# Patient Record
Sex: FEMALE | Race: WHITE | Employment: FULL TIME | URBAN - METROPOLITAN AREA
[De-identification: names, ages, dates, MRNs, and addresses within clinical notes are randomized per-mention and may not be internally consistent; named-entity substitution may affect disease eponyms.]

---

## 2018-10-29 ENCOUNTER — OFFICE VISIT (OUTPATIENT)
Dept: FAMILY MEDICINE CLINIC | Age: 53
End: 2018-10-29
Payer: COMMERCIAL

## 2018-10-29 VITALS
HEART RATE: 80 BPM | DIASTOLIC BLOOD PRESSURE: 80 MMHG | WEIGHT: 208.8 LBS | SYSTOLIC BLOOD PRESSURE: 112 MMHG | HEIGHT: 67 IN | OXYGEN SATURATION: 96 % | BODY MASS INDEX: 32.77 KG/M2

## 2018-10-29 DIAGNOSIS — Z78.0 POST-MENOPAUSAL: ICD-10-CM

## 2018-10-29 DIAGNOSIS — F41.9 ANXIETY: ICD-10-CM

## 2018-10-29 DIAGNOSIS — Z86.19 HX OF HISTOPLASMOSIS: ICD-10-CM

## 2018-10-29 DIAGNOSIS — Z00.00 HEALTHCARE MAINTENANCE: ICD-10-CM

## 2018-10-29 DIAGNOSIS — Z76.89 ENCOUNTER TO ESTABLISH CARE: ICD-10-CM

## 2018-10-29 DIAGNOSIS — R06.02 SOB (SHORTNESS OF BREATH): Primary | ICD-10-CM

## 2018-10-29 PROCEDURE — G8482 FLU IMMUNIZE ORDER/ADMIN: HCPCS | Performed by: FAMILY MEDICINE

## 2018-10-29 PROCEDURE — 99204 OFFICE O/P NEW MOD 45 MIN: CPT | Performed by: FAMILY MEDICINE

## 2018-10-29 PROCEDURE — 1036F TOBACCO NON-USER: CPT | Performed by: FAMILY MEDICINE

## 2018-10-29 PROCEDURE — 3017F COLORECTAL CA SCREEN DOC REV: CPT | Performed by: FAMILY MEDICINE

## 2018-10-29 PROCEDURE — G8427 DOCREV CUR MEDS BY ELIG CLIN: HCPCS | Performed by: FAMILY MEDICINE

## 2018-10-29 PROCEDURE — G8417 CALC BMI ABV UP PARAM F/U: HCPCS | Performed by: FAMILY MEDICINE

## 2018-10-29 RX ORDER — CITALOPRAM 10 MG/1
10 TABLET ORAL DAILY
Qty: 33 TABLET | Refills: 0 | Status: SHIPPED | OUTPATIENT
Start: 2018-10-29 | End: 2019-02-18 | Stop reason: SDUPTHER

## 2018-10-29 RX ORDER — LORAZEPAM 1 MG/1
1 TABLET ORAL EVERY 6 HOURS PRN
COMMUNITY
End: 2018-12-03 | Stop reason: ALTCHOICE

## 2018-10-29 RX ORDER — BUSPIRONE HYDROCHLORIDE 10 MG/1
5 TABLET ORAL 2 TIMES DAILY
COMMUNITY
End: 2019-01-25 | Stop reason: ALTCHOICE

## 2018-10-29 RX ORDER — MELOXICAM 15 MG/1
15 TABLET ORAL DAILY
Qty: 90 TABLET | Refills: 1 | Status: SHIPPED | COMMUNITY
Start: 2018-10-29 | End: 2018-12-03 | Stop reason: SDUPTHER

## 2018-10-29 RX ORDER — MELOXICAM 7.5 MG/1
7.5 TABLET ORAL DAILY
COMMUNITY
End: 2018-10-29 | Stop reason: DRUGHIGH

## 2018-10-29 RX ORDER — LEVOTHYROXINE SODIUM 137 UG/1
137 TABLET ORAL DAILY
COMMUNITY
End: 2019-01-25 | Stop reason: SDUPTHER

## 2018-10-29 ASSESSMENT — ENCOUNTER SYMPTOMS
SHORTNESS OF BREATH: 1
TROUBLE SWALLOWING: 0
COLOR CHANGE: 0
CHEST TIGHTNESS: 0
CONSTIPATION: 0
DIARRHEA: 0
WHEEZING: 0
COUGH: 0
NAUSEA: 0
BLOOD IN STOOL: 0
SORE THROAT: 0
VOMITING: 0
ABDOMINAL PAIN: 0
BACK PAIN: 0

## 2018-10-29 ASSESSMENT — PATIENT HEALTH QUESTIONNAIRE - PHQ9
SUM OF ALL RESPONSES TO PHQ9 QUESTIONS 1 & 2: 2
1. LITTLE INTEREST OR PLEASURE IN DOING THINGS: 1
SUM OF ALL RESPONSES TO PHQ QUESTIONS 1-9: 2
SUM OF ALL RESPONSES TO PHQ QUESTIONS 1-9: 2
2. FEELING DOWN, DEPRESSED OR HOPELESS: 1

## 2018-10-29 NOTE — PATIENT INSTRUCTIONS
risk for heart attack and stroke. · Blood pressure. Have your blood pressure checked during a routine doctor visit. Your doctor will tell you how often to check your blood pressure based on your age, your blood pressure results, and other factors. · Mammogram. Ask your doctor how often you should have a mammogram, which is an X-ray of your breasts. A mammogram can spot breast cancer before it can be felt and when it is easiest to treat. · Pap test and pelvic exam. Ask your doctor how often you should have a Pap test. You may not need to have a Pap test as often as you used to. · Vision. Have your eyes checked every year or two or as often as your doctor suggests. Some experts recommend that you have yearly exams for glaucoma and other age-related eye problems starting at age 48. · Hearing. Tell your doctor if you notice any change in your hearing. You can have tests to find out how well you hear. · Diabetes. Ask your doctor whether you should have tests for diabetes. · Colon cancer. You should begin tests for colon cancer at age 48. You may have one of several tests. Your doctor will tell you how often to have tests based on your age and risk. Risks include whether you already had a precancerous polyp removed from your colon or whether your parents, sisters and brothers, or children have had colon cancer. · Thyroid disease. Talk to your doctor about whether to have your thyroid checked as part of a regular physical exam. Women have an increased chance of a thyroid problem. · Osteoporosis. You should begin tests for bone density at age 72. If you are younger than 72, ask your doctor whether you have factors that may increase your risk for this disease. You may want to have this test before age 72. · Heart attack and stroke risk. At least every 4 to 6 years, you should have your risk for heart attack and stroke assessed.  Your doctor uses factors such as your age, blood pressure, cholesterol, and whether you for longer than recommended. Measure liquid medicine with the dosing syringe provided, or with a special dose-measuring spoon or medicine cup. If you do not have a dose-measuring device, ask your pharmacist for one. It may take up to 4 weeks before your symptoms improve. Keep using the medication as directed and tell your doctor if your symptoms do not improve. Do not stop using citalopram suddenly, or you could have unpleasant withdrawal symptoms. Ask your doctor how to safely stop using this medicine. Store at room temperature away from moisture and heat. What happens if I miss a dose? Take the missed dose as soon as you remember. Skip the missed dose if it is almost time for your next scheduled dose. Do not take extra medicine to make up the missed dose. What happens if I overdose? Seek emergency medical attention or call the Poison Help line at 1-254.199.2548. What should I avoid while taking citalopram?  Ask your doctor before taking a nonsteroidal anti-inflammatory drug (NSAID) for pain, arthritis, fever, or swelling. This includes aspirin, ibuprofen (Advil, Motrin), naproxen (Aleve), celecoxib (Celebrex), diclofenac, indomethacin, meloxicam, and others. Using an NSAID with citalopram may cause you to bruise or bleed easily. Drinking alcohol can increase certain side effects of citalopram.  Citalopram may impair your thinking or reactions. Be careful if you drive or do anything that requires you to be alert. What are the possible side effects of citalopram?  Get emergency medical help if you have signs of an allergic reaction: skin rash or hives; difficulty breathing; swelling of your face, lips, tongue, or throat.   Report any new or worsening symptoms to your doctor, such as: mood or behavior changes, anxiety, panic attacks, trouble sleeping, or if you feel impulsive, irritable, agitated, hostile, aggressive, restless, hyperactive (mentally or physically), more depressed, or have thoughts about suicide or hurting yourself. Call your doctor at once if you have:  · a light-headed feeling, like you might pass out;  · blurred vision, tunnel vision, eye pain or swelling, or seeing halos around lights;  · headache with chest pain and severe dizziness, fainting, fast or pounding heartbeats;  · severe nervous system reaction --very stiff (rigid) muscles, high fever, sweating, confusion, fast or uneven heartbeats, tremors, feeling like you might pass out;  · high levels of serotonin in the body --agitation, hallucinations, fever, fast heart rate, overactive reflexes, nausea, vomiting, diarrhea, loss of coordination, fainting; or  · low levels of sodium in the body --headache, confusion, slurred speech, severe weakness, vomiting, feeling unsteady. Common side effects may include:  · problems with memory or concentration;  · headache, drowsiness;  · dry mouth, increased sweating;  · numbness or tingling;  · increased appetite, nausea, diarrhea, gas;  · fast heartbeats, feeling shaky;  · sleep problems (insomnia), feeling tired;  · cold symptoms such as stuffy nose, sneezing, sore throat;  · changes in weight; or  · difficulty having an orgasm. This is not a complete list of side effects and others may occur. Call your doctor for medical advice about side effects. You may report side effects to FDA at 2-264-FDA-6439. What other drugs will affect citalopram?  Taking citalopram with other drugs that make you sleepy or slow your breathing can cause dangerous side effects or death. Ask your doctor before taking a sleeping pill, narcotic pain medicine, prescription cough medicine, a muscle relaxer, or medicine for anxiety, depression, or seizures. Many drugs can interact with citalopram. Not all possible interactions are listed here.  Tell your doctor about all your current medicines and any you start or stop using, especially:  · cimetidine;  · lithium;  · [place]'s wort;  · tryptophan (sometimes called

## 2018-10-30 ENCOUNTER — HOSPITAL ENCOUNTER (OUTPATIENT)
Dept: GENERAL RADIOLOGY | Age: 53
Discharge: HOME | End: 2018-10-30
Payer: COMMERCIAL

## 2018-10-30 ENCOUNTER — HOSPITAL ENCOUNTER (OUTPATIENT)
Age: 53
Discharge: HOME | End: 2018-10-30
Payer: COMMERCIAL

## 2018-10-30 DIAGNOSIS — R06.02 SHORTNESS OF BREATH: ICD-10-CM

## 2018-10-30 DIAGNOSIS — Z86.19 HX OF HISTOPLASMOSIS: ICD-10-CM

## 2018-10-30 DIAGNOSIS — Z86.19 HISTORY OF HISTOPLASMOSIS: ICD-10-CM

## 2018-10-30 DIAGNOSIS — R06.02 SOB (SHORTNESS OF BREATH): ICD-10-CM

## 2018-10-30 PROCEDURE — 71046 X-RAY EXAM CHEST 2 VIEWS: CPT

## 2018-11-28 PROBLEM — Z00.00 HEALTHCARE MAINTENANCE: Status: RESOLVED | Noted: 2018-10-29 | Resolved: 2018-11-28

## 2018-12-03 ENCOUNTER — OFFICE VISIT (OUTPATIENT)
Dept: FAMILY MEDICINE CLINIC | Age: 53
End: 2018-12-03
Payer: COMMERCIAL

## 2018-12-03 VITALS
BODY MASS INDEX: 32.83 KG/M2 | HEART RATE: 70 BPM | WEIGHT: 209.6 LBS | DIASTOLIC BLOOD PRESSURE: 82 MMHG | SYSTOLIC BLOOD PRESSURE: 116 MMHG | OXYGEN SATURATION: 98 %

## 2018-12-03 DIAGNOSIS — R06.83 SNORING: ICD-10-CM

## 2018-12-03 DIAGNOSIS — F41.9 ANXIETY: Primary | ICD-10-CM

## 2018-12-03 DIAGNOSIS — R53.82 CHRONIC FATIGUE: ICD-10-CM

## 2018-12-03 DIAGNOSIS — Z00.00 HEALTHCARE MAINTENANCE: ICD-10-CM

## 2018-12-03 DIAGNOSIS — H93.8X1 CONGESTION OF RIGHT EAR: ICD-10-CM

## 2018-12-03 PROCEDURE — 1036F TOBACCO NON-USER: CPT | Performed by: FAMILY MEDICINE

## 2018-12-03 PROCEDURE — G8427 DOCREV CUR MEDS BY ELIG CLIN: HCPCS | Performed by: FAMILY MEDICINE

## 2018-12-03 PROCEDURE — 99214 OFFICE O/P EST MOD 30 MIN: CPT | Performed by: FAMILY MEDICINE

## 2018-12-03 PROCEDURE — G8417 CALC BMI ABV UP PARAM F/U: HCPCS | Performed by: FAMILY MEDICINE

## 2018-12-03 PROCEDURE — 3017F COLORECTAL CA SCREEN DOC REV: CPT | Performed by: FAMILY MEDICINE

## 2018-12-03 PROCEDURE — G8482 FLU IMMUNIZE ORDER/ADMIN: HCPCS | Performed by: FAMILY MEDICINE

## 2018-12-03 RX ORDER — CETIRIZINE HYDROCHLORIDE 10 MG/1
10 TABLET ORAL DAILY
Qty: 30 TABLET | Refills: 5 | Status: SHIPPED | OUTPATIENT
Start: 2018-12-03 | End: 2019-05-23 | Stop reason: SDUPTHER

## 2018-12-03 RX ORDER — MELOXICAM 15 MG/1
15 TABLET ORAL DAILY
Qty: 90 TABLET | Refills: 0 | Status: SHIPPED | OUTPATIENT
Start: 2018-12-03 | End: 2019-01-25 | Stop reason: ALTCHOICE

## 2018-12-03 RX ORDER — FLUTICASONE PROPIONATE 50 MCG
2 SPRAY, SUSPENSION (ML) NASAL DAILY
Qty: 1 BOTTLE | Refills: 0 | Status: SHIPPED | OUTPATIENT
Start: 2018-12-03

## 2018-12-03 ASSESSMENT — ENCOUNTER SYMPTOMS
SHORTNESS OF BREATH: 0
CONSTIPATION: 0
COLOR CHANGE: 0
VOMITING: 0
BACK PAIN: 0
CHEST TIGHTNESS: 0
DIARRHEA: 0
ABDOMINAL PAIN: 0
ABDOMINAL DISTENTION: 0
NAUSEA: 0

## 2018-12-03 NOTE — PROGRESS NOTES
12/3/2018    This is a 48 y.o. female who presents for  Chief Complaint   Patient presents with    Medication Check    Sinus Problem       HPI:     Anxiety:  Celexa and Buspar prescribed prior  Increased hours at work, working on sat/sun  Until 12/21  Hasn't started Celexa  Worried about false pos drug tests   Buspar helping  Gone from the house 13 hours a day so often forgets medication    April/march possibly had pap smear   [facility]   Dr. [place]     R fullness of ear and throat  Things feel like they get stuck with swallowing  Wakes self up at night with SOB/snoring   ?deviated septum   Inc fatigue during the day       Past Medical History:   Diagnosis Date    Anxiety     can't sleep       Past Surgical History:   Procedure Laterality Date    ANKLE SURGERY  2016    CHOLECYSTECTOMY, LAPAROSCOPIC  2008    MANDIBLE SURGERY      underbite    THYROIDECTOMY, PARTIAL Right 2003    hashimoto's on path, nodular to intiate   [address] [place]  2015       Social History     Social History    Marital status:      Spouse name: N/A    Number of children: N/A    Years of education: N/A     Occupational History    Not on file.      Social History Main Topics    Smoking status: Never Smoker    Smokeless tobacco: Never Used    Alcohol use No    Drug use: No    Sexual activity: Yes     Other Topics Concern    Not on file     Social History Narrative    No narrative on file       Family History   Problem Relation Age of Onset    Diabetes Mother     Prostate Cancer Father         76s    Diabetes Paternal Uncle     Cancer Paternal Uncle     Prostate Cancer Paternal Uncle     Cancer Paternal Grandmother         stomach       Current Outpatient Prescriptions   Medication Sig Dispense Refill    meloxicam (MOBIC) 15 MG tablet Take 1 tablet by mouth daily 90 tablet 0    cetirizine (ZYRTEC) 10 MG tablet Take 1 tablet by mouth daily 30 tablet 5

## 2019-01-25 ENCOUNTER — OFFICE VISIT (OUTPATIENT)
Dept: FAMILY MEDICINE CLINIC | Age: 54
End: 2019-01-25
Payer: COMMERCIAL

## 2019-01-25 VITALS
HEART RATE: 65 BPM | SYSTOLIC BLOOD PRESSURE: 110 MMHG | WEIGHT: 202 LBS | OXYGEN SATURATION: 97 % | DIASTOLIC BLOOD PRESSURE: 80 MMHG | BODY MASS INDEX: 31.64 KG/M2 | TEMPERATURE: 98.5 F

## 2019-01-25 DIAGNOSIS — E03.9 ACQUIRED HYPOTHYROIDISM: ICD-10-CM

## 2019-01-25 DIAGNOSIS — Z86.19 HISTORY OF HISTOPLASMOSIS: ICD-10-CM

## 2019-01-25 DIAGNOSIS — J01.90 ACUTE BACTERIAL SINUSITIS: Primary | ICD-10-CM

## 2019-01-25 DIAGNOSIS — B96.89 ACUTE BACTERIAL SINUSITIS: Primary | ICD-10-CM

## 2019-01-25 DIAGNOSIS — Z00.00 HEALTHCARE MAINTENANCE: ICD-10-CM

## 2019-01-25 PROCEDURE — G8417 CALC BMI ABV UP PARAM F/U: HCPCS | Performed by: FAMILY MEDICINE

## 2019-01-25 PROCEDURE — 99213 OFFICE O/P EST LOW 20 MIN: CPT | Performed by: FAMILY MEDICINE

## 2019-01-25 PROCEDURE — 3017F COLORECTAL CA SCREEN DOC REV: CPT | Performed by: FAMILY MEDICINE

## 2019-01-25 PROCEDURE — G8482 FLU IMMUNIZE ORDER/ADMIN: HCPCS | Performed by: FAMILY MEDICINE

## 2019-01-25 PROCEDURE — G8427 DOCREV CUR MEDS BY ELIG CLIN: HCPCS | Performed by: FAMILY MEDICINE

## 2019-01-25 PROCEDURE — 1036F TOBACCO NON-USER: CPT | Performed by: FAMILY MEDICINE

## 2019-01-25 RX ORDER — AMOXICILLIN AND CLAVULANATE POTASSIUM 875; 125 MG/1; MG/1
1 TABLET, FILM COATED ORAL 2 TIMES DAILY
Qty: 20 TABLET | Refills: 0 | Status: SHIPPED | OUTPATIENT
Start: 2019-01-25 | End: 2019-02-04

## 2019-01-25 RX ORDER — FLUTICASONE PROPIONATE 50 MCG
2 SPRAY, SUSPENSION (ML) NASAL DAILY
Qty: 1 BOTTLE | Refills: 0 | Status: SHIPPED | OUTPATIENT
Start: 2019-01-25 | End: 2019-02-18 | Stop reason: SDUPTHER

## 2019-01-25 RX ORDER — LEVOTHYROXINE SODIUM 137 UG/1
137 TABLET ORAL DAILY
Qty: 30 TABLET | Refills: 5 | Status: SHIPPED | OUTPATIENT
Start: 2019-01-25 | End: 2019-05-23 | Stop reason: SDUPTHER

## 2019-01-25 RX ORDER — CETIRIZINE HYDROCHLORIDE 10 MG/1
10 TABLET ORAL DAILY
Qty: 90 TABLET | Refills: 1 | Status: SHIPPED | OUTPATIENT
Start: 2019-01-25 | End: 2019-02-18 | Stop reason: ALTCHOICE

## 2019-01-25 ASSESSMENT — ENCOUNTER SYMPTOMS
TROUBLE SWALLOWING: 0
SINUS PAIN: 1
PHOTOPHOBIA: 0
EYE PAIN: 0
EYE ITCHING: 0
COUGH: 0
VOMITING: 0
CHEST TIGHTNESS: 0
CHOKING: 0
WHEEZING: 0
APNEA: 0
VOICE CHANGE: 0
SORE THROAT: 1
EYE REDNESS: 0
EYE DISCHARGE: 0
ABDOMINAL PAIN: 0
RHINORRHEA: 0
SHORTNESS OF BREATH: 0
SINUS PRESSURE: 1
STRIDOR: 0
NAUSEA: 0

## 2019-02-18 ENCOUNTER — OFFICE VISIT (OUTPATIENT)
Dept: FAMILY MEDICINE CLINIC | Age: 54
End: 2019-02-18
Payer: COMMERCIAL

## 2019-02-18 VITALS
OXYGEN SATURATION: 98 % | DIASTOLIC BLOOD PRESSURE: 80 MMHG | HEART RATE: 75 BPM | BODY MASS INDEX: 32.73 KG/M2 | WEIGHT: 209 LBS | SYSTOLIC BLOOD PRESSURE: 130 MMHG

## 2019-02-18 DIAGNOSIS — Z12.4 CERVICAL CANCER SCREENING: Primary | ICD-10-CM

## 2019-02-18 DIAGNOSIS — N89.8 VAGINAL DISCHARGE: ICD-10-CM

## 2019-02-18 DIAGNOSIS — F41.9 ANXIETY: ICD-10-CM

## 2019-02-18 RX ORDER — CITALOPRAM 10 MG/1
10 TABLET ORAL DAILY
Qty: 90 TABLET | Refills: 1 | Status: SHIPPED | OUTPATIENT
Start: 2019-02-18 | End: 2019-08-15

## 2019-02-18 RX ORDER — AZITHROMYCIN 250 MG/1
TABLET, FILM COATED ORAL
COMMUNITY
Start: 2019-02-10 | End: 2019-08-15 | Stop reason: ALTCHOICE

## 2019-02-18 ASSESSMENT — ENCOUNTER SYMPTOMS
ABDOMINAL DISTENTION: 0
VOMITING: 0
ABDOMINAL PAIN: 0
BACK PAIN: 0
NAUSEA: 0
COLOR CHANGE: 0
CONSTIPATION: 0
DIARRHEA: 0
SHORTNESS OF BREATH: 0

## 2019-02-18 ASSESSMENT — PATIENT HEALTH QUESTIONNAIRE - PHQ9
2. FEELING DOWN, DEPRESSED OR HOPELESS: 1
SUM OF ALL RESPONSES TO PHQ QUESTIONS 1-9: 2
1. LITTLE INTEREST OR PLEASURE IN DOING THINGS: 1
SUM OF ALL RESPONSES TO PHQ QUESTIONS 1-9: 2
SUM OF ALL RESPONSES TO PHQ9 QUESTIONS 1 & 2: 2

## 2019-02-19 LAB
C. TRACHOMATIS DNA,THIN PREP: NEGATIVE
CANDIDA SPECIES, DNA PROBE: NORMAL
GARDNERELLA VAGINALIS, DNA PROBE: NORMAL
N. GONORRHOEAE DNA, THIN PREP: NEGATIVE
TRICHOMONAS VAGINALIS DNA: NORMAL

## 2019-02-20 DIAGNOSIS — Z00.00 HEALTHCARE MAINTENANCE: ICD-10-CM

## 2019-02-20 LAB
A/G RATIO: 1.6 (ref 1.1–2.2)
ALBUMIN SERPL-MCNC: 4.4 G/DL (ref 3.4–5)
ALP BLD-CCNC: 81 U/L (ref 40–129)
ALT SERPL-CCNC: 14 U/L (ref 10–40)
ANION GAP SERPL CALCULATED.3IONS-SCNC: 14 MMOL/L (ref 3–16)
AST SERPL-CCNC: 17 U/L (ref 15–37)
BILIRUB SERPL-MCNC: 0.4 MG/DL (ref 0–1)
BUN BLDV-MCNC: 9 MG/DL (ref 7–20)
CALCIUM SERPL-MCNC: 9.4 MG/DL (ref 8.3–10.6)
CHLORIDE BLD-SCNC: 100 MMOL/L (ref 99–110)
CHOLESTEROL, TOTAL: 188 MG/DL (ref 0–199)
CO2: 23 MMOL/L (ref 21–32)
CREAT SERPL-MCNC: 0.6 MG/DL (ref 0.6–1.1)
GFR AFRICAN AMERICAN: >60
GFR NON-AFRICAN AMERICAN: >60
GLOBULIN: 2.7 G/DL
GLUCOSE BLD-MCNC: 88 MG/DL (ref 70–99)
HCT VFR BLD CALC: 39.7 % (ref 36–48)
HDLC SERPL-MCNC: 67 MG/DL (ref 40–60)
HEMOGLOBIN: 13 G/DL (ref 12–16)
HEPATITIS C ANTIBODY INTERPRETATION: NORMAL
HPV COMMENT: NORMAL
HPV TYPE 16: NOT DETECTED
HPV TYPE 18: NOT DETECTED
HPVOH (OTHER TYPES): NOT DETECTED
LDL CHOLESTEROL CALCULATED: 102 MG/DL
MCH RBC QN AUTO: 30.1 PG (ref 26–34)
MCHC RBC AUTO-ENTMCNC: 32.7 G/DL (ref 31–36)
MCV RBC AUTO: 92 FL (ref 80–100)
PDW BLD-RTO: 14 % (ref 12.4–15.4)
PLATELET # BLD: 292 K/UL (ref 135–450)
PMV BLD AUTO: 10.4 FL (ref 5–10.5)
POTASSIUM SERPL-SCNC: 4.5 MMOL/L (ref 3.5–5.1)
RBC # BLD: 4.32 M/UL (ref 4–5.2)
SODIUM BLD-SCNC: 137 MMOL/L (ref 136–145)
TOTAL PROTEIN: 7.1 G/DL (ref 6.4–8.2)
TRIGL SERPL-MCNC: 94 MG/DL (ref 0–150)
TSH REFLEX: 3.41 UIU/ML (ref 0.27–4.2)
VITAMIN D 25-HYDROXY: 21.2 NG/ML
VLDLC SERPL CALC-MCNC: 19 MG/DL
WBC # BLD: 6.6 K/UL (ref 4–11)

## 2019-02-21 LAB
ESTIMATED AVERAGE GLUCOSE: 116.9 MG/DL
HBA1C MFR BLD: 5.7 %
HIV AG/AB: NORMAL
HIV ANTIGEN: NORMAL
HIV-1 ANTIBODY: NORMAL
HIV-2 AB: NORMAL

## 2019-03-07 ENCOUNTER — TELEPHONE (OUTPATIENT)
Dept: FAMILY MEDICINE CLINIC | Age: 54
End: 2019-03-07

## 2019-05-23 ENCOUNTER — TELEPHONE (OUTPATIENT)
Dept: FAMILY MEDICINE CLINIC | Age: 54
End: 2019-05-23

## 2019-05-23 ENCOUNTER — TELEPHONE (OUTPATIENT)
Dept: ORTHOPEDIC SURGERY | Age: 54
End: 2019-05-23

## 2019-05-23 ENCOUNTER — OFFICE VISIT (OUTPATIENT)
Dept: ORTHOPEDIC SURGERY | Age: 54
End: 2019-05-23
Payer: COMMERCIAL

## 2019-05-23 VITALS — WEIGHT: 195 LBS | BODY MASS INDEX: 30.61 KG/M2 | HEIGHT: 67 IN

## 2019-05-23 DIAGNOSIS — M25.561 RIGHT KNEE PAIN, UNSPECIFIED CHRONICITY: Primary | ICD-10-CM

## 2019-05-23 DIAGNOSIS — S83.211A BUCKET-HANDLE TEAR OF MEDIAL MENISCUS OF RIGHT KNEE AS CURRENT INJURY, INITIAL ENCOUNTER: Primary | ICD-10-CM

## 2019-05-23 DIAGNOSIS — R06.02 SHORTNESS OF BREATH: Primary | ICD-10-CM

## 2019-05-23 DIAGNOSIS — Z86.19 HISTORY OF HISTOPLASMOSIS: ICD-10-CM

## 2019-05-23 PROCEDURE — G8427 DOCREV CUR MEDS BY ELIG CLIN: HCPCS | Performed by: ORTHOPAEDIC SURGERY

## 2019-05-23 PROCEDURE — 99203 OFFICE O/P NEW LOW 30 MIN: CPT | Performed by: ORTHOPAEDIC SURGERY

## 2019-05-23 PROCEDURE — 3017F COLORECTAL CA SCREEN DOC REV: CPT | Performed by: ORTHOPAEDIC SURGERY

## 2019-05-23 PROCEDURE — 1036F TOBACCO NON-USER: CPT | Performed by: ORTHOPAEDIC SURGERY

## 2019-05-23 PROCEDURE — G8417 CALC BMI ABV UP PARAM F/U: HCPCS | Performed by: ORTHOPAEDIC SURGERY

## 2019-05-23 NOTE — TELEPHONE ENCOUNTER
Patient had mri order submitted by lorena mtz, 5/23/19. Mri was approved and I called the patient to let her know this. Provided her with "[facility] [facility] number and [facility] number to call and cecilia mri and follow up.  Order was faxed to "[facility] [facility]

## 2019-05-23 NOTE — LETTER
[place] [address] [address]  Phone: [phone]  Fax: [phone]    Kimani Rivera DO        May 23, 2019     Patient: Dory Bennett   YOB: 1965   Date of Visit: 5/23/2019       To Whom it May Concern:    Shadi Lara was seen in my clinic on 5/23/2019. She may return to work on 5/23/19. If you have any questions or concerns, please don't hesitate to call.     Sincerely,       Kimani Rivera DO  [facility] [address] [facility] partner of [place], [place]

## 2019-05-23 NOTE — PROGRESS NOTES
Chief Complaint:  New Patient (NP, RIGHT KNEE )      History of Present Illness:  Skylar Borrero is a 48 y.o. female here regarding right knee pain, she twisted her knee getting out of a car one month ago has continued medial sided pain since the injury worse with twisting. She does have a history of a right knee video arthroscopy with partial medial meniscectomy.   Currently 3 out of 10 sharp medial sided pain, dull aching pain at rest.    Pain Assessment:  Pain Assessment  Location of Pain: Knee  Location Modifiers: Right  Severity of Pain: 3  Quality of Pain: Throbbing, Sharp, Dull, Aching  Duration of Pain: A few minutes  Frequency of Pain: Several times daily  Aggravating Factors: Walking, Standing, Bending  Limiting Behavior: Yes  Relieving Factors: Rest, Ice  Result of Injury: No  Work-Related Injury: No  Are there other pain locations you wish to document?: No    Medical History:  Past Medical History:   Diagnosis Date    Anxiety     can't sleep     Past Surgical History:   Procedure Laterality Date    ANKLE SURGERY  2016    CHOLECYSTECTOMY, LAPAROSCOPIC  2008    MANDIBLE SURGERY      underbite    THYROIDECTOMY, PARTIAL Right 2003    hashimoto's on path, nodular to intiate   [address] [place]  2015     Social History     Socioeconomic History    Marital status:      Spouse name: None    Number of children: None    Years of education: None    Highest education level: None   Occupational History    None   Social Needs    Financial resource strain: None    Food insecurity:     Worry: None     Inability: None    Transportation needs:     Medical: None     Non-medical: None   Tobacco Use    Smoking status: Never Smoker    Smokeless tobacco: Never Used   Substance and Sexual Activity    Alcohol use: No    Drug use: No    Sexual activity: Yes   Lifestyle    Physical activity:     Days per week: None     Minutes per session: None    Stress: None   Relationships  Social connections:     Talks on phone: None     Gets together: None     Attends Shinto service: None     Active member of club or organization: None     Attends meetings of clubs or organizations: None     Relationship status: None    Intimate partner violence:     Fear of current or ex partner: None     Emotionally abused: None     Physically abused: None     Forced sexual activity: None   Other Topics Concern    None   Social History Narrative    None     Allergies   Allergen Reactions    Dust Mite Extract     Seasonal        Review of Systems:  Constitutional: negative  Respiratory: negative  Cardiovascular: negative  Musculoskeletal:negative except for New Patient (NP, RIGHT KNEE )    Relevant review of systems reviewed and available in the patient's chart in media tab    Vital Signs:  Vitals:    05/23/19 1328   Weight: 195 lb (88.5 kg)   Height: 5' 7\" (1.702 m)         General Exam:   Constitutional: Patient is adequately groomed with no evidence of malnutrition  Mental Status: The patient is oriented to time, place and person. The patient's mood and affect are appropriate. Vascular: Examination reveals no swelling or calf tenderness. Peripheral pulses are palpable and 2+. right Knee Examination  Inspection:   No gross deformities noted. mild swelling noted. No erythema or ecchymosis. Skin is intact. Palpation: positive Tenderness to palpation along the medial joint line, negative Tenderness to palpation along lateral joint line, negative Tenderness to palpation along medial and lateral facets of undersurface of the patella    Range of Motion:  0-140    Strength:  Able to do SLR    Special Tests:  ACL, MCL, PCL, LCL are intact with stress exam.  There negative crepitus noted with range of motion under the patella. A negative grind test.  positive Kristy's and Apley compression test.       Skin: There are no rashes, ulcerations or lesions.     Gait: Normal    Reflex: not tested    Additional Examinations:  Left Lower Extremity: Examination of the left lower extremity does not show any tenderness, deformity or injury. Range of motion is unremarkable. There is no gross instability. There are no rashes, ulcerations or lesions. Strength and tone are normal.      LUMBAR SPINE: The skin is warm and dry. There is no swelling, warmth, or erythema. Range of motion is within normal limits. There is no paraspinal or spinous process tenderness. Ipsilateral and contralateral straight leg raising tests are negative. The distal neurovascular exam is grossly intact and symmetric. X-RAYS: 4 views weightbearing AP, lateral. PA and sunrise of the right knee were obtained and reviewed, they show no periosteal reaction, medullary lesions, or osteopenia. Mild medial compartment joint space narrowing with marginal osteophytes, lateral patellofemoral Joint spaces are well maintained. No evidence of fracture or dislocation. Assessment :  Right knee degenerative meniscus tear    Impression:  Encounter Diagnosis   Name Primary?  Right knee pain, unspecified chronicity Yes       Office Procedures:  Orders Placed This Encounter   Procedures    XR KNEE RIGHT (MIN 4 VIEWS)     Standing Status:   Future     Number of Occurrences:   1     Standing Expiration Date:   5/23/2020     No orders of the defined types were placed in this encounter. Treatment Plan:  Based on patient's history and physical exam I'm concerned about a medial meniscus tear causing mechanical symptoms therefore I would like to obtain an MRI to further evaluate. Once the MRI is obtained the patient will follow up with me for further evaluation and discussion. Patient  agrees with this plan, all of their questions were answered best of our ability and to their satisfaction.           [facility]

## 2019-05-24 NOTE — TELEPHONE ENCOUNTER
He also has a location in [facility] if that is closer. Please let her know, and ask which location she prefers?

## 2019-06-06 ENCOUNTER — OFFICE VISIT (OUTPATIENT)
Dept: ORTHOPEDIC SURGERY | Age: 54
End: 2019-06-06
Payer: COMMERCIAL

## 2019-06-06 VITALS — WEIGHT: 195.11 LBS | HEIGHT: 67 IN | BODY MASS INDEX: 30.62 KG/M2

## 2019-06-06 DIAGNOSIS — M94.20 CHONDROMALACIA: Primary | ICD-10-CM

## 2019-06-06 DIAGNOSIS — M71.21 BAKER CYST, RIGHT: ICD-10-CM

## 2019-06-06 PROCEDURE — 99213 OFFICE O/P EST LOW 20 MIN: CPT | Performed by: ORTHOPAEDIC SURGERY

## 2019-06-06 PROCEDURE — G8417 CALC BMI ABV UP PARAM F/U: HCPCS | Performed by: ORTHOPAEDIC SURGERY

## 2019-06-06 PROCEDURE — G8427 DOCREV CUR MEDS BY ELIG CLIN: HCPCS | Performed by: ORTHOPAEDIC SURGERY

## 2019-06-06 PROCEDURE — 3017F COLORECTAL CA SCREEN DOC REV: CPT | Performed by: ORTHOPAEDIC SURGERY

## 2019-06-06 PROCEDURE — 1036F TOBACCO NON-USER: CPT | Performed by: ORTHOPAEDIC SURGERY

## 2019-06-06 RX ORDER — MELOXICAM 15 MG/1
15 TABLET ORAL DAILY
Qty: 30 TABLET | Refills: 3 | Status: SHIPPED | OUTPATIENT
Start: 2019-06-06 | End: 2019-08-15

## 2019-06-06 NOTE — PROGRESS NOTES
Chief Complaint:  Follow-up (TR MRI R KNEE)      SUBJECTIVE:  Jocelin Marte is a 48 y.o. female who returns today for evaluation of right knee, continues to have 4 out of 10 medial sided sharp pain with twisting, dull achy pain at rest.  Did notice large swelling posteriorly, this is beginning to resolve. Pain Assessment:  Pain Assessment  Location of Pain: Knee  Location Modifiers: Right  Severity of Pain: 4  Quality of Pain: Aching, Sharp  Duration of Pain: A few hours  Frequency of Pain: Intermittent  Aggravating Factors: Exercise, Kneeling  Limiting Behavior: Some  Relieving Factors: Rest  Result of Injury: Yes  Work-Related Injury: No  Are there other pain locations you wish to document?: No      Medical History:  Patient's medications, allergies, past medical, surgical, social and family histories were reviewed and updated as appropriate. Review of Systems:  Constitutional: negative  Respiratory: negative  Cardiovascular: negative  Musculoskeletal:negative except for Follow-up (TR MRI R KNEE)    Relevant review of systems reviewed and available in the patient's chart in media tab    General Exam:   Constitutional: Patient is adequately groomed with no evidence of malnutrition  Mental Status: The patient is oriented to time, place and person. The patient's mood and affect are appropriate. Vascular: Examination reveals no swelling or calf tenderness. Peripheral pulses are palpable and 2+. OBJECTIVE:  Vital Signs:  Vitals:    06/06/19 1604   Weight: 195 lb 1.7 oz (88.5 kg)   Height: 5' 7.01\" (1.702 m)       Appearance: alert, well appearing, and in no distress, oriented to person, place, and time and overweight. Physical exam:   Tenderness to palpation along the medial joint line, palpable Baker's cyst at today's visit. Tolerates full range of motion, ambulates with no assistive devices. Distal neurovascular exam is intact.         MRI images of the right knee were reviewed and show:  Status post

## 2019-08-15 ENCOUNTER — OFFICE VISIT (OUTPATIENT)
Dept: PULMONOLOGY | Age: 54
End: 2019-08-15
Payer: COMMERCIAL

## 2019-08-15 VITALS
SYSTOLIC BLOOD PRESSURE: 112 MMHG | HEIGHT: 67 IN | HEART RATE: 74 BPM | OXYGEN SATURATION: 98 % | DIASTOLIC BLOOD PRESSURE: 78 MMHG | BODY MASS INDEX: 31.71 KG/M2 | WEIGHT: 202 LBS

## 2019-08-15 DIAGNOSIS — Z86.19 HISTORY OF HISTOPLASMOSIS: ICD-10-CM

## 2019-08-15 DIAGNOSIS — R05.9 COUGH: Primary | ICD-10-CM

## 2019-08-15 PROCEDURE — G8427 DOCREV CUR MEDS BY ELIG CLIN: HCPCS | Performed by: INTERNAL MEDICINE

## 2019-08-15 PROCEDURE — 99244 OFF/OP CNSLTJ NEW/EST MOD 40: CPT | Performed by: INTERNAL MEDICINE

## 2019-08-15 PROCEDURE — G8417 CALC BMI ABV UP PARAM F/U: HCPCS | Performed by: INTERNAL MEDICINE

## 2019-08-15 NOTE — PROGRESS NOTES
Chief Complaint: cough and globus sensation    Consulting provider: Geno Mcmullen MD    HPI: 47 y.o. female patient is being seen at the request of Geno Mcmullen MD for a consultation regarding a cough. The patient does not have SOB or CP rest but has mild [place] that she attributes to a significant weight gain in the last couple of years. Exercise tolerance on level ground is normal.  The patient has a daily intermittent cough that is usually dry. Cough present for well over 5 years. No better or worse currently than 5 years ago. Worse in the morning when waking up and better trough the day. No wheezing. She has a h/o normal lung function testing and allergic rhinitis. She has daily postnasal drainage and feels like she has some stuck in the back of her throat particularly on the right side. She has this feeling chronically. She usually has a dry cough. She has frequent sinus fullness including today. She has not been using flonase. She does use claritin  She has gained 15 lbs in the last 2 years. Never smoker  Environmental/chemical exposure: Asbestos exposure: no    Patient worked as a fork   TB exposure: no    The information above included the review and summarization of old records. The history was obtained from these old records and from the patient directly. Outside information from Dr. Darlyn Valle regarding cough was reviewed. Prior PFts obtained and reviewed. Normal.    REVIEW OF SYSTEMS:    See HPI and scanned Review of Systems sheet. Past Medical History:   Diagnosis Date    Anxiety     can't sleep     Past Surgical History      Procedure Laterality Date    ANKLE SURGERY  2016    CHOLECYSTECTOMY, LAPAROSCOPIC  2008    MANDIBLE SURGERY      underbite    THYROIDECTOMY, PARTIAL Right 2003    hashimoto's on path, nodular to intiate    TUBAL LIGATION  1990    WRIST SURGERY  2015     Social History:    TOBACCO:   reports that she has never smoked.  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

## 2019-09-16 ENCOUNTER — OFFICE VISIT (OUTPATIENT)
Dept: ORTHOPEDIC SURGERY | Age: 54
End: 2019-09-16
Payer: COMMERCIAL

## 2019-09-16 VITALS
HEART RATE: 73 BPM | DIASTOLIC BLOOD PRESSURE: 86 MMHG | BODY MASS INDEX: 31.7 KG/M2 | WEIGHT: 201.94 LBS | HEIGHT: 67 IN | SYSTOLIC BLOOD PRESSURE: 137 MMHG

## 2019-09-16 DIAGNOSIS — M17.11 PRIMARY OSTEOARTHRITIS OF RIGHT KNEE: ICD-10-CM

## 2019-09-16 DIAGNOSIS — M94.20 CHONDROMALACIA: Primary | ICD-10-CM

## 2019-09-16 PROCEDURE — G8427 DOCREV CUR MEDS BY ELIG CLIN: HCPCS | Performed by: ORTHOPAEDIC SURGERY

## 2019-09-16 PROCEDURE — G8417 CALC BMI ABV UP PARAM F/U: HCPCS | Performed by: ORTHOPAEDIC SURGERY

## 2019-09-16 PROCEDURE — 20610 DRAIN/INJ JOINT/BURSA W/O US: CPT | Performed by: ORTHOPAEDIC SURGERY

## 2019-09-16 PROCEDURE — 1036F TOBACCO NON-USER: CPT | Performed by: ORTHOPAEDIC SURGERY

## 2019-09-16 PROCEDURE — 99213 OFFICE O/P EST LOW 20 MIN: CPT | Performed by: ORTHOPAEDIC SURGERY

## 2019-09-16 PROCEDURE — 3017F COLORECTAL CA SCREEN DOC REV: CPT | Performed by: ORTHOPAEDIC SURGERY

## 2019-09-16 NOTE — PROGRESS NOTES
Tenderness to palpation along the medial joint line, crepitus under the patella with range of motion, positive grind test and positive Kristy's. 5 out of 5 flexion extension strength, no assistive devices and normal gait. Distal neurovascular exam is intact. No effusion to the right knee. MRI images of the right knee were reviewed and show:  Status post medial meniscectomy and trimming with no evidence of retear. Mixed grade 3-4 medial compartment chondromalacia including large full thickness defect central femoral weightbearing 1.4 x 1.2cm with small penetrating bone erosions and minor marrow stress reaction. surface with small penetrating bone erosions and minor marrow stress reaction. Early compartment remodeling and marginal osteophytosis (Kellgren-Moris 3 radiographic equivalent). Mixed grade 3-4 patellofemoral chondromalacia including large trochlear groove erosions. Mixed grade 2-3 lateral compartment chondromalacia. Complex multiloculated Baker's cyst containing multiple foci of synovial metaplasia with or without chondral bodies, noted on the prior MRI report of 2014. Assessment :  Right knee osteoarthritis    Impression:  Encounter Diagnosis   Name Primary?  Chondromalacia Yes       Office Procedures:  No orders of the defined types were placed in this encounter. No orders of the defined types were placed in this encounter. Treatment Plan:      The risks and benefits of an injection were discussed with the patient. The patient had full opportunity to ask questions and all were answered. The patient then provided verbal informed consent. The skin was then prepped with betadine solution and alcohol. Under aseptic conditions, the  right knee was injected with 4cc of 1% lidocaine, 4cc of 0.25% marcaine and 80 mg of Depomedrol. There were no immediate complications following the injection.   The patient was advised of the possibility of injection site reaction

## 2019-09-23 ENCOUNTER — TELEPHONE (OUTPATIENT)
Dept: ORTHOPEDIC SURGERY | Age: 54
End: 2019-09-23

## 2019-09-25 ENCOUNTER — TELEPHONE (OUTPATIENT)
Dept: ORTHOPEDIC SURGERY | Age: 54
End: 2019-09-25

## 2019-09-30 ENCOUNTER — NURSE ONLY (OUTPATIENT)
Dept: FAMILY MEDICINE CLINIC | Age: 54
End: 2019-09-30
Payer: COMMERCIAL

## 2019-09-30 DIAGNOSIS — Z23 NEED FOR INFLUENZA VACCINATION: Primary | ICD-10-CM

## 2019-09-30 PROCEDURE — 90471 IMMUNIZATION ADMIN: CPT | Performed by: FAMILY MEDICINE

## 2019-09-30 PROCEDURE — 90686 IIV4 VACC NO PRSV 0.5 ML IM: CPT | Performed by: FAMILY MEDICINE

## 2019-10-09 ENCOUNTER — TELEPHONE (OUTPATIENT)
Dept: ADMINISTRATIVE | Age: 54
End: 2019-10-09

## 2019-10-17 ENCOUNTER — OFFICE VISIT (OUTPATIENT)
Dept: ORTHOPEDIC SURGERY | Age: 54
End: 2019-10-17
Payer: COMMERCIAL

## 2019-10-17 VITALS — WEIGHT: 200 LBS | BODY MASS INDEX: 31.39 KG/M2 | HEIGHT: 67 IN

## 2019-10-17 DIAGNOSIS — M17.11 PRIMARY OSTEOARTHRITIS OF RIGHT KNEE: Primary | ICD-10-CM

## 2019-10-17 PROCEDURE — 1036F TOBACCO NON-USER: CPT | Performed by: ORTHOPAEDIC SURGERY

## 2019-10-17 PROCEDURE — G8417 CALC BMI ABV UP PARAM F/U: HCPCS | Performed by: ORTHOPAEDIC SURGERY

## 2019-10-17 PROCEDURE — G8482 FLU IMMUNIZE ORDER/ADMIN: HCPCS | Performed by: ORTHOPAEDIC SURGERY

## 2019-10-17 PROCEDURE — G8427 DOCREV CUR MEDS BY ELIG CLIN: HCPCS | Performed by: ORTHOPAEDIC SURGERY

## 2019-10-17 PROCEDURE — 20610 DRAIN/INJ JOINT/BURSA W/O US: CPT | Performed by: ORTHOPAEDIC SURGERY

## 2019-10-17 PROCEDURE — 99213 OFFICE O/P EST LOW 20 MIN: CPT | Performed by: ORTHOPAEDIC SURGERY

## 2019-10-17 PROCEDURE — 3017F COLORECTAL CA SCREEN DOC REV: CPT | Performed by: ORTHOPAEDIC SURGERY

## 2019-10-17 RX ORDER — HYALURONATE SODIUM 10 MG/ML
20 SYRINGE (ML) INTRAARTICULAR ONCE
Status: COMPLETED | OUTPATIENT
Start: 2019-10-17 | End: 2019-10-17

## 2019-10-17 RX ADMIN — Medication 20 MG: at 07:59

## 2019-10-24 ENCOUNTER — OFFICE VISIT (OUTPATIENT)
Dept: ORTHOPEDIC SURGERY | Age: 54
End: 2019-10-24
Payer: COMMERCIAL

## 2019-10-24 VITALS — HEIGHT: 67 IN | WEIGHT: 199.96 LBS | BODY MASS INDEX: 31.38 KG/M2

## 2019-10-24 DIAGNOSIS — M17.11 PRIMARY OSTEOARTHRITIS OF RIGHT KNEE: Primary | ICD-10-CM

## 2019-10-24 PROCEDURE — 20610 DRAIN/INJ JOINT/BURSA W/O US: CPT | Performed by: ORTHOPAEDIC SURGERY

## 2019-10-24 RX ORDER — HYALURONATE SODIUM 10 MG/ML
20 SYRINGE (ML) INTRAARTICULAR ONCE
Status: COMPLETED | OUTPATIENT
Start: 2019-10-24 | End: 2019-10-24

## 2019-10-24 RX ADMIN — Medication 20 MG: at 08:03

## 2019-10-31 ENCOUNTER — OFFICE VISIT (OUTPATIENT)
Dept: ORTHOPEDIC SURGERY | Age: 54
End: 2019-10-31
Payer: COMMERCIAL

## 2019-10-31 VITALS — BODY MASS INDEX: 31.38 KG/M2 | WEIGHT: 199.96 LBS | HEIGHT: 67 IN

## 2019-10-31 DIAGNOSIS — M17.11 PRIMARY OSTEOARTHRITIS OF RIGHT KNEE: Primary | ICD-10-CM

## 2019-10-31 PROCEDURE — 20610 DRAIN/INJ JOINT/BURSA W/O US: CPT | Performed by: ORTHOPAEDIC SURGERY

## 2019-12-11 ENCOUNTER — TELEPHONE (OUTPATIENT)
Dept: FAMILY MEDICINE CLINIC | Age: 54
End: 2019-12-11

## 2019-12-11 DIAGNOSIS — Z78.9 PATIENT REQUESTS ALTERNATE TREATMENT: Primary | ICD-10-CM

## 2019-12-19 ENCOUNTER — OFFICE VISIT (OUTPATIENT)
Dept: ENT CLINIC | Age: 54
End: 2019-12-19
Payer: COMMERCIAL

## 2019-12-19 VITALS
HEART RATE: 83 BPM | SYSTOLIC BLOOD PRESSURE: 126 MMHG | HEIGHT: 67 IN | BODY MASS INDEX: 31.99 KG/M2 | DIASTOLIC BLOOD PRESSURE: 77 MMHG | TEMPERATURE: 98.1 F | WEIGHT: 203.8 LBS

## 2019-12-19 DIAGNOSIS — J34.89 NASAL DRYNESS: ICD-10-CM

## 2019-12-19 DIAGNOSIS — K21.9 LARYNGOPHARYNGEAL REFLUX (LPR): Primary | ICD-10-CM

## 2019-12-19 PROCEDURE — 31575 DIAGNOSTIC LARYNGOSCOPY: CPT | Performed by: OTOLARYNGOLOGY

## 2019-12-19 PROCEDURE — 99203 OFFICE O/P NEW LOW 30 MIN: CPT | Performed by: OTOLARYNGOLOGY

## 2019-12-19 RX ORDER — OMEPRAZOLE 40 MG/1
40 CAPSULE, DELAYED RELEASE ORAL
Qty: 90 CAPSULE | Refills: 1 | Status: SHIPPED | OUTPATIENT
Start: 2019-12-19 | End: 2022-03-17

## 2019-12-19 RX ORDER — ECHINACEA PURPUREA EXTRACT 125 MG
1 TABLET ORAL PRN
Qty: 1 BOTTLE | Refills: 3 | Status: SHIPPED | OUTPATIENT
Start: 2019-12-19

## 2019-12-19 RX ORDER — RANITIDINE 150 MG/1
150 TABLET ORAL NIGHTLY
Qty: 90 TABLET | Refills: 1 | Status: SHIPPED | OUTPATIENT
Start: 2019-12-19 | End: 2020-05-29 | Stop reason: ALTCHOICE

## 2020-02-07 ENCOUNTER — OFFICE VISIT (OUTPATIENT)
Dept: FAMILY MEDICINE CLINIC | Age: 55
End: 2020-02-07
Payer: COMMERCIAL

## 2020-02-07 VITALS
HEIGHT: 67 IN | WEIGHT: 208.2 LBS | BODY MASS INDEX: 32.68 KG/M2 | OXYGEN SATURATION: 96 % | SYSTOLIC BLOOD PRESSURE: 118 MMHG | TEMPERATURE: 97.8 F | HEART RATE: 82 BPM | DIASTOLIC BLOOD PRESSURE: 80 MMHG

## 2020-02-07 PROBLEM — R09.A2 GLOBUS SENSATION: Status: ACTIVE | Noted: 2020-02-07

## 2020-02-07 PROBLEM — K21.00 GASTROESOPHAGEAL REFLUX DISEASE WITH ESOPHAGITIS: Status: ACTIVE | Noted: 2020-02-07

## 2020-02-07 PROBLEM — R09.89 GLOBUS SENSATION: Status: ACTIVE | Noted: 2020-02-07

## 2020-02-07 PROCEDURE — 99213 OFFICE O/P EST LOW 20 MIN: CPT | Performed by: NURSE PRACTITIONER

## 2020-02-07 PROCEDURE — 1036F TOBACCO NON-USER: CPT | Performed by: NURSE PRACTITIONER

## 2020-02-07 PROCEDURE — 3017F COLORECTAL CA SCREEN DOC REV: CPT | Performed by: NURSE PRACTITIONER

## 2020-02-07 PROCEDURE — G8482 FLU IMMUNIZE ORDER/ADMIN: HCPCS | Performed by: NURSE PRACTITIONER

## 2020-02-07 PROCEDURE — G8427 DOCREV CUR MEDS BY ELIG CLIN: HCPCS | Performed by: NURSE PRACTITIONER

## 2020-02-07 PROCEDURE — G8417 CALC BMI ABV UP PARAM F/U: HCPCS | Performed by: NURSE PRACTITIONER

## 2020-02-07 ASSESSMENT — ENCOUNTER SYMPTOMS
BACK PAIN: 0
SHORTNESS OF BREATH: 1
COUGH: 1
DIARRHEA: 0
CHEST TIGHTNESS: 0
NAUSEA: 0

## 2020-02-07 ASSESSMENT — PATIENT HEALTH QUESTIONNAIRE - PHQ9
SUM OF ALL RESPONSES TO PHQ9 QUESTIONS 1 & 2: 0
SUM OF ALL RESPONSES TO PHQ QUESTIONS 1-9: 0
1. LITTLE INTEREST OR PLEASURE IN DOING THINGS: 0
SUM OF ALL RESPONSES TO PHQ QUESTIONS 1-9: 0
2. FEELING DOWN, DEPRESSED OR HOPELESS: 0

## 2020-02-27 ENCOUNTER — OFFICE VISIT (OUTPATIENT)
Dept: ENT CLINIC | Age: 55
End: 2020-02-27
Payer: COMMERCIAL

## 2020-02-27 VITALS
HEART RATE: 76 BPM | WEIGHT: 208 LBS | DIASTOLIC BLOOD PRESSURE: 79 MMHG | TEMPERATURE: 98.3 F | HEIGHT: 67 IN | BODY MASS INDEX: 32.65 KG/M2 | SYSTOLIC BLOOD PRESSURE: 120 MMHG

## 2020-02-27 PROBLEM — H93.13 TINNITUS AURIUM, BILATERAL: Status: ACTIVE | Noted: 2020-02-27

## 2020-02-27 PROBLEM — R05.8 DRY COUGH: Status: ACTIVE | Noted: 2020-02-27

## 2020-02-27 PROCEDURE — 1036F TOBACCO NON-USER: CPT | Performed by: OTOLARYNGOLOGY

## 2020-02-27 PROCEDURE — 99213 OFFICE O/P EST LOW 20 MIN: CPT | Performed by: OTOLARYNGOLOGY

## 2020-02-27 PROCEDURE — 3017F COLORECTAL CA SCREEN DOC REV: CPT | Performed by: OTOLARYNGOLOGY

## 2020-02-27 PROCEDURE — G8482 FLU IMMUNIZE ORDER/ADMIN: HCPCS | Performed by: OTOLARYNGOLOGY

## 2020-02-27 PROCEDURE — G8427 DOCREV CUR MEDS BY ELIG CLIN: HCPCS | Performed by: OTOLARYNGOLOGY

## 2020-02-27 PROCEDURE — G8417 CALC BMI ABV UP PARAM F/U: HCPCS | Performed by: OTOLARYNGOLOGY

## 2020-02-27 NOTE — PROGRESS NOTES
an emollient. Interval History: This visit, she feels reflux symptoms have improved - previously 1-2x/week, now only sporadically. Has been using nasal saline sprays, emmolients - no epistaxis. Continues to have globus sensation (does not feel it's improved) - localized to the right side of the neck - no neck swelling, no pain - feels a \"rubbing sensation\" (again, from the right thyroid ala all the way to the xiphoid process) if she swallows very hard or lies on the right side. Occasionally feels dysphagia - feels like \"[food] catches\". Occasionally like \"my throat closes up\" with difficulty breathing if she laughs too hard. Symptoms have not improved with use of an inhaler or flonase (prescribed per prior providers). Does not regurgitate food, no halitosis. She also continues to have a dry cough - constantly, as with above HPI - no changes to this. Can climb two flights of stairs without issue. Has a sister with asthma. Exposed to second hand smoke. 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 Constant in nature, bilateral, non-pulsatile, +intrusive.      Past Medical History     Past Medical History:   Diagnosis Date    Allergic rhinitis     Anxiety     can't sleep    Arthritis     Asthma     Dental disease     Dizziness     Fracture of nasal bone     GERD (gastroesophageal reflux disease)     Hearing loss     Tinnitus     TMJ dysfunction        Past Surgical History     Past Surgical History:   Procedure Laterality Date    ANKLE SURGERY  2016    CHOLECYSTECTOMY, LAPAROSCOPIC  2008    MANDIBLE SURGERY      underbite    THYROIDECTOMY, PARTIAL Right 2003    hashimoto's on path, nodular to intiate    TUBAL LIGATION  1990    WRIST SURGERY  2015       Family History     Family History   Problem Relation Age of Onset    Diabetes Mother     Prostate Cancer Father localized to the right side of the neck, becomes worse with laughing or exertion such as walking upstairs. Initially we suspected laryngeal pathology such as a laryngocele, reflux resulting in laryngeal inflammation, or neoplasm-nasopharyngolaryngoscopy without laryngocele or masses identified, and although the patient has reflux (which has improved with treatment) she continues to have globus sensation. Interestingly, she also states a dry cough and a feeling of tightness during swallowing. We will refer her to gastroenterology for further work-up. - Tavo Gomez MD, Gastroenterology, [facility]    2. Gastroesophageal reflux disease with esophagitis  Patient with dry cough, globus sensation (from the right thyroid cartilage down to the xiphoid process), nasopharyngolaryngoscopy without interarytenoid pachydermia, significant inflammation however she does do note symptoms of reflux once to twice a week with postural changes (such as bending over to tie her shoes). This improved with antireflux medications-now she only notices it intermittently.   -Continue antireflux medications  -Referral to gastroenterology for management of dry cough and globus sensation- reflux may be a component of her symptoms, although more time may be needed to determine if her globus sensation resolves with treatment    3. Tinnitus aurium, bilateral  Patient states bilateral tinnitus, constant, no pulsatile quality, occasionally intrusive. She has a history of significant noise exposure and may have some element of hearing loss due to this-she obtains yearly audiograms, and we have asked her to bring one to the clinic at next follow-up visit.     4. Dry cough  As above, with globus sensation-patient with dry cough, which she states was associated with pulmonary histoplasmosis however interestingly worsens when she laughs or lies on her right side-could have an element of diaphragmatic/vagal nerve compression resulting in irritation and cough, or may be due to reflux, asthma or rhinosinusitis. Of interest, she has been on Flonase in the past (for a 3-month course) with no improvement in dry cough, and otherwise denies facial pressure/pain feeling of postnasal drip.  -Refer her to gastroenterology for cup of globus sensation and dry cough, will consider referral to Pulmonology following their evaluation    Return in about 3 months (around 5/27/2020). Mike Santiago MD  [facility]  Department of Otolaryngology/Head and Neck Surgery  2/27/20    I have performed a head and neck physical exam personally or was physically present during the key or critical portions of the service. Medical Decision Making: The following items were considered in medical decision making:  Independent review of images  Review / order clinical lab tests  Review / order radiology tests  Decision to obtain old records  Review and summation of old records as accessed through [facility] (a summary of my findings in these old records: none)     Portions of this note were dictated using Dragon.  There may be linguistic errors secondary to the use of this program.

## 2020-04-02 ENCOUNTER — TELEPHONE (OUTPATIENT)
Dept: FAMILY MEDICINE CLINIC | Age: 55
End: 2020-04-02

## 2020-04-02 NOTE — LETTER
[address] [facility] [address]  [address] [address]  Phone: [phone]  Fax: [phone]    Michelle Maier MD        April 3, 2020      To whom it may concern:    Obdulia Traore is currently a patient under my care, along with her son. Due to her son's special needs, I strongly recommend that Natalie Morris remain home and available daily to care for him. If you have any questions, please call my office.      Sincerely,        Michelle Maier MD

## 2020-04-02 NOTE — TELEPHONE ENCOUNTER
Pt is requesting a call back today from pcp to discuss getting a doctor's note to be home to care for son. Please advise.

## 2020-04-20 ENCOUNTER — TELEPHONE (OUTPATIENT)
Dept: FAMILY MEDICINE CLINIC | Age: 55
End: 2020-04-20

## 2020-04-22 ENCOUNTER — TELEPHONE (OUTPATIENT)
Dept: FAMILY MEDICINE CLINIC | Age: 55
End: 2020-04-22

## 2020-05-19 ENCOUNTER — TELEPHONE (OUTPATIENT)
Dept: FAMILY MEDICINE CLINIC | Age: 55
End: 2020-05-19

## 2020-05-19 NOTE — TELEPHONE ENCOUNTER
[facility] received a call from: Pt    Name of Caller: Martine Duarte    Relationship to patient: self    Organization name:     Best contact number:     Reason for call: [phone]    Pt states [employer] is missing pg 4 and missing information from the last page of FMLA ppwk.  Please contact [employer] at [phone] ask for Diana Pena.

## 2020-05-28 ENCOUNTER — TELEPHONE (OUTPATIENT)
Dept: FAMILY MEDICINE CLINIC | Age: 55
End: 2020-05-28

## 2020-05-29 ENCOUNTER — TELEMEDICINE (OUTPATIENT)
Dept: FAMILY MEDICINE CLINIC | Age: 55
End: 2020-05-29
Payer: COMMERCIAL

## 2020-05-29 PROCEDURE — 99214 OFFICE O/P EST MOD 30 MIN: CPT | Performed by: FAMILY MEDICINE

## 2020-05-29 PROCEDURE — G8417 CALC BMI ABV UP PARAM F/U: HCPCS | Performed by: FAMILY MEDICINE

## 2020-05-29 PROCEDURE — 1036F TOBACCO NON-USER: CPT | Performed by: FAMILY MEDICINE

## 2020-05-29 PROCEDURE — 3017F COLORECTAL CA SCREEN DOC REV: CPT | Performed by: FAMILY MEDICINE

## 2020-05-29 PROCEDURE — G8427 DOCREV CUR MEDS BY ELIG CLIN: HCPCS | Performed by: FAMILY MEDICINE

## 2020-05-29 RX ORDER — METHOCARBAMOL 500 MG/1
500 TABLET, FILM COATED ORAL 3 TIMES DAILY PRN
Qty: 30 TABLET | Refills: 1 | Status: SHIPPED | OUTPATIENT
Start: 2020-05-29 | End: 2021-03-22 | Stop reason: ALTCHOICE

## 2020-05-29 RX ORDER — NAPROXEN 250 MG/1
500 TABLET ORAL 2 TIMES DAILY WITH MEALS
Qty: 60 TABLET | Refills: 1 | Status: SHIPPED | OUTPATIENT
Start: 2020-05-29 | End: 2021-03-22 | Stop reason: ALTCHOICE

## 2020-05-29 ASSESSMENT — ENCOUNTER SYMPTOMS
VOMITING: 0
ABDOMINAL PAIN: 0
COLOR CHANGE: 0
SHORTNESS OF BREATH: 0
BACK PAIN: 0
NAUSEA: 0

## 2020-05-29 NOTE — PROGRESS NOTES
Mouth/Throat: Mucous membranes are moist.     External Ears [x] Normal  [] Abnormal-     Neck: [x] No visualized mass     Pulmonary/Chest: [x] Respiratory effort normal.  [x] No visualized signs of difficulty breathing or respiratory distress        [] Abnormal-      Musculoskeletal:   [] Normal gait with no signs of ataxia         [x] Normal range of motion of neck        [] Abnormal-       Neurological:        [x] No Facial Asymmetry (Cranial nerve 7 motor function) (limited exam to video visit)          [] No gaze palsy        [] Abnormal-         Skin:        [x] No significant exanthematous lesions or discoloration noted on facial skin         [] Abnormal-            Psychiatric:       [x] Normal Affect [] No Hallucinations        [] Abnormal-     Other pertinent observable physical exam findings-     ASSESSMENT/PLAN:  1. Neck pain  2. Acute left-sided thoracic back pain  3. Muscle spasm  Appears purely MSK at this stage. If not improved by Monday, consider steroids and topical NSAID  Daily heat/exercises/ice regiment discussed in detail, h/o's sent via [employer]   NSAIDs scheduled BID with food for 10-14 days  - methocarbamol (ROBAXIN) 500 MG tablet; Take 1 tablet by mouth 3 times daily as needed (muscle pain and spasm)  Dispense: 30 tablet; Refill: 1  - naproxen (NAPROSYN) 250 MG tablet; Take 2 tablets by mouth 2 times daily (with meals)  Dispense: 60 tablet; Refill: 1    4. Nasal congestion  2 sprays Flonase AM daily and restart anti-histamine    Return if symptoms worsen or fail to improve. Hola Stallworth is a 47 y.o. female being evaluated by a Virtual Visit (video visit) encounter to address concerns as mentioned above. A caregiver was present when appropriate. Due to this being a TeleHealth encounter (During BKXTX-53 public health emergency), evaluation of the following organ systems was limited: Vitals/Constitutional/EENT/Resp/CV/GI//MS/Neuro/Skin/Heme-Lymph-Imm.   Pursuant to the emergency declaration under the [address] [facility], [address] [place] [address] [facility] authority and the [employer] Act, this Virtual Visit was conducted with patient's (and/or legal guardian's) consent, to reduce the patient's risk of exposure to COVID-19 and provide necessary medical care. The patient (and/or legal guardian) has also been advised to contact this office for worsening conditions or problems, and seek emergency medical treatment and/or call 911 if deemed necessary. Services were provided through a video synchronous discussion virtually to substitute for in-person clinic visit. Patient and provider were located at their individual homes. --Adrianne Yun MD on 5/29/2020 at 9:46 AM    An electronic signature was used to authenticate this note.

## 2020-05-29 NOTE — PATIENT INSTRUCTIONS
seconds. 4. Repeat 2 to 4 times. Triceps stretch   1. Reach your arm straight up. 2. Keeping your elbow in place, bend your arm and reach your hand down behind your back. 3. With your other hand, apply gentle pressure to the bent elbow. Rafita Pickard feel a stretch at the back of your upper arm and shoulder. Hold about 6 seconds. 4. Repeat 2 to 4 times with each arm. Shoulder stretch   1. Relax your shoulders. 2. Raise one arm to shoulder height, and reach it across your chest.  3. Pull the arm slightly toward you with your other arm. This will help you get a gentle stretch. Hold for about 6 seconds. 4. Repeat 2 to 4 times. Shoulder blade squeeze   1. Sit or stand up tall with your arms at your sides. 2. Keep your shoulders relaxed and down, not shrugged. 3. Squeeze your shoulder blades together. Hold for 6 seconds, then relax. 4. Repeat 8 to 12 times. Straight-arm shoulder blade squeeze   1. Sit or stand tall. Relax your shoulders. 2. With palms down, hold your elastic tubing or band straight out in front of you. 3. Start with slight tension in the tubing or band, with your hands about shoulder-width apart. 4. Slowly pull straight out to the sides, squeezing your shoulder blades together. Keep your arms straight and at shoulder height. Slowly release. 5. Repeat 8 to 12 times. Rowing   1. [place] your elastic tubing or band at about waist height. Take one end in each hand. 2. Sit or stand with your feet hip-width apart. 3. Hold your arms straight in front of you. Adjust your distance to create slight tension in the tubing or band. 4. Slightly tuck your chin. Relax your shoulders. 5. Without shrugging your shoulders, pull straight back. Your elbows will pass alongside your waist.    Pull-downs   1. [place] your elastic tubing or band in the top of a closed door. Take one end in each hand. 2. Either sit or stand, depending on what is more comfortable.  If you feel unsteady, sit on a chair.  3. Start with your arms up and comfortably apart, elbows straight. There should be a slight tension in the tubing or band. 4. Slightly tuck your chin, and look straight ahead. 5. Keeping your back straight, slowly pull down and back, bending your elbows. 6. Stop where your hands are level with your chin, in a \"goalpost\" position. 7. Repeat 8 to 12 times. Chest T stretch   1. Lie on your back. Raise your knees so they are bent. Plant your feet on the floor, hip-width apart. 2. Tuck your chin, and relax your shoulders. 3. Reach your arms straight out to the sides. If you don't feel a mild stretch in your shoulders and across your chest, use a foam roll or a tightly rolled blanket under your spine, from your tailbone to your head. 4. Relax in this position for at least 15 to 30 seconds while you breathe normally. Repeat 2 to 4 times. 5. As you get used to this stretch, keep adding a little more time until you are able relax in this position for 2 or 3 minutes. When you can relax for at least 2 minutes, you only need to do the exercise 1 time per session. Chest goalpost stretch   1. Lie on your back. Raise your knees so they are bent. Plant your feet on the floor, hip-width apart. 2. Tuck your chin, and relax your shoulders. 3. Reach your arms straight out to the sides. 4. Bend your arms at the elbows, with your hands pointed toward the top of your head. Your arms should make an L on either side of your head. Your palms should be facing up. 5. If you don't feel a mild stretch in your shoulders and across your chest, use a foam roll or tightly rolled blanket under your spine, from your tailbone to your head. 6. Relax in this position for at least 15 to 30 seconds while you breathe normally. Repeat 2 to 4 times. 7. Each day you do this exercise, add a little more time until you can relax in this position for 2 or 3 minutes.  When you can relax for at least 2 minutes, you only need to do the exercise 1 time per session. Follow-up care is a key part of your treatment and safety. Be sure to make and go to all appointments, and call your doctor if you are having problems. It's also a good idea to know your test results and keep a list of the medicines you take. Where can you learn more? Go to [email or web address] org and sign in to your [employer] account. Enter [phone] in the Notis[employer]tv box to learn more about \"Shoulder Blade: Exercises. \"     If you do not have an account, please click on the \"Sign Up Now\" link. Current as of: March 2, 2020               Content Version: 12.5  © 2006-2020 Healthwise, Incorporated. Care instructions adapted under license by [facility] ([facility]). If you have questions about a medical condition or this instruction, always ask your healthcare professional. [place] [address] any warranty or liability for your use of this information. Patient Education        Healthy Upper Back: Exercises  Introduction  Here are some examples of exercises for your upper back. Start each exercise slowly. Ease off the exercise if you start to have pain. Your doctor or physical therapist will tell you when you can start these exercises and which ones will work best for you. How to do the exercises  Lower neck and upper back stretch   1. Stretch your arms out in front of your body. Clasp one hand on top of your other hand. 2. Gently reach out so that you feel your shoulder blades stretching away from each other. 3. Gently bend your head forward. 4. Hold for 15 to 30 seconds. 5. Repeat 2 to 4 times. Midback stretch   If you have knee pain, do not do this exercise. 1. Kneel on the floor, and sit back on your ankles. 2. Lean forward, place your hands on the floor, and stretch your arms out in front of you. Rest your head between your arms. 3. Gently push your chest toward the floor, reaching as far in front of you as possible.   4. Hold for [employer] account. Enter O331 in the [facility] box to learn more about \"Healthy Upper Back: Exercises. \"     If you do not have an account, please click on the \"Sign Up Now\" link. Current as of: March 2, 2020               Content Version: 12.5  © 5653-6275 Healthwise, Incorporated. Care instructions adapted under license by [facility] ([facility]). If you have questions about a medical condition or this instruction, always ask your healthcare professional. [address] any warranty or liability for your use of this information.

## 2020-08-13 NOTE — TELEPHONE ENCOUNTER
----- Message from Bishop Shepherdtristan sent at 8/13/2020 11:15 AM EDT -----  Subject: Appointment Request    Reason for Call: Routine Existing Condition Follow Up    QUESTIONS  Type of Appointment? Established Patient  Reason for appointment request? No appointments available during search  Additional Information for Provider?   ---------------------------------------------------------------------------  --------------  CALL BACK INFO  What is the best way for the office to contact you? OK to leave message on   voicemail  Preferred Call Back Phone Number? [phone]  ---------------------------------------------------------------------------  --------------  SCRIPT ANSWERS  Relationship to Patient? Self  Appointment reason? Well Care/Follow Ups  Select a Well Care/Follow Ups appointment reason? Adult Existing Condition   Follow Up [Diabetes   CHF   COPD   Hypertension/Blood Pressure Check]  (Is the patient requesting to be seen urgently for their symptoms?)? No  Is this follow up request related to routine Diabetes Management? No  Are you having any new concerns about your existing condition? No  Have you been diagnosed with   tested for   or told that you are suspected of having COVID-19 (Coronavirus)? Yes  Did your symptoms begin or have you been tested for COVID-19 in the last   10 days?  Yes

## 2020-08-13 NOTE — TELEPHONE ENCOUNTER
Refill Request     Last Seen: 5/29/2020    Last Written: 4/3/2020    Next Appointment:   No future appointments.           Requested Prescriptions     Pending Prescriptions Disp Refills    levothyroxine (SYNTHROID) 137 MCG tablet 90 tablet 0     Sig: Take 1 tablet by mouth once daily

## 2020-08-13 NOTE — TELEPHONE ENCOUNTER
Patient states she has sinus symptoms, ear pain, headache. She got tested for COVID yesterday and is waiting for results. She is due for TSH bloodwork and also needs refill of levothyroxine. Will wait on bloodwork but wants to know if she can get refill.   Please send to [facility] in [facility]

## 2020-08-14 RX ORDER — LEVOTHYROXINE SODIUM 137 UG/1
TABLET ORAL
Qty: 90 TABLET | Refills: 0 | Status: SHIPPED | OUTPATIENT
Start: 2020-08-14 | End: 2021-01-15 | Stop reason: SDUPTHER

## 2020-08-24 ENCOUNTER — NURSE TRIAGE (OUTPATIENT)
Dept: OTHER | Facility: CLINIC | Age: 55
End: 2020-08-24

## 2020-08-24 ENCOUNTER — TELEPHONE (OUTPATIENT)
Dept: FAMILY MEDICINE CLINIC | Age: 55
End: 2020-08-24

## 2020-08-24 NOTE — TELEPHONE ENCOUNTER
Reason for Disposition   MILD difficulty breathing (e.g., minimal/no SOB at rest, SOB with walking, pulse < 100) of new onset or worse than normal    Answer Assessment - Initial Assessment Questions  1. RESPIRATORY STATUS: \"Describe your breathing? \" (e.g., wheezing, shortness of breath, unable to speak, severe coughing)       Feels sob when moving around, bending over, walking  2. ONSET: \"When did this breathing problem begin? \"     Was seen at an  last week, given prednisone, COVID was negative  3. PATTERN \"Does the difficult breathing come and go, or has it been constant since it started? \"   Intermittent with exertion  4. SEVERITY: \"How bad is your breathing? \" (e.g., mild, moderate, severe)     - MILD: No SOB at rest, mild SOB with walking, speaks normally in sentences, can lay down, no retractions, pulse < 100.     - MODERATE: SOB at rest, SOB with minimal exertion and prefers to sit, cannot lie down flat, speaks in phrases, mild retractions, audible wheezing, pulse 100-120.     - SEVERE: Very SOB at rest, speaks in single words, struggling to breathe, sitting hunched forward, retractions, pulse > 120   Speaking fine. 5. RECURRENT SYMPTOM: \"Have you had difficulty breathing before? \" If so, ask: \"When was the last time? \" and \"What happened that time?\"     6. CARDIAC HISTORY: \"Do you have any history of heart disease? \" (e.g., heart attack, angina, bypass surgery, angioplasty)   no  7. LUNG HISTORY: \"Do you have any history of lung disease? \"  (e.g., pulmonary embolus, asthma, emphysema)  no  8. CAUSE: \"What do you think is causing the breathing problem?\"       9. OTHER SYMPTOMS: \"Do you have any other symptoms? (e.g., dizziness, runny nose, cough, chest pain, fever)  Cough. No fever. No chest pain  10. PREGNANCY: \"Is there any chance you are pregnant? \" \"When was your last menstrual period? \"         11. TRAVEL: \"Have you traveled out of the country in the last month? \" (e.g., travel history, exposures)    Protocols used: BREATHING DIFFICULTY-ADULT-[place]  Received call from [facility]. Pt calling with sob with exertion and a cough for a couple of weeks. Was seen last week at an [place] was negative and was given a steroid and an inhaler. Not getting any better, still with sob. No chest pain, no fever. Recommend pt is seen today, call sooner if worsens. Call soft transferred to [facility] in [address] to schedule appointment. Please do not reply to the triage nurse through this encounter. Any subsequent communication should be directly with the patient.

## 2020-08-25 ENCOUNTER — OFFICE VISIT (OUTPATIENT)
Dept: FAMILY MEDICINE CLINIC | Age: 55
End: 2020-08-25
Payer: COMMERCIAL

## 2020-08-25 VITALS
HEART RATE: 93 BPM | DIASTOLIC BLOOD PRESSURE: 80 MMHG | SYSTOLIC BLOOD PRESSURE: 122 MMHG | TEMPERATURE: 98 F | OXYGEN SATURATION: 97 %

## 2020-08-25 PROCEDURE — 3017F COLORECTAL CA SCREEN DOC REV: CPT | Performed by: PHYSICIAN ASSISTANT

## 2020-08-25 PROCEDURE — 1036F TOBACCO NON-USER: CPT | Performed by: PHYSICIAN ASSISTANT

## 2020-08-25 PROCEDURE — G8427 DOCREV CUR MEDS BY ELIG CLIN: HCPCS | Performed by: PHYSICIAN ASSISTANT

## 2020-08-25 PROCEDURE — G8417 CALC BMI ABV UP PARAM F/U: HCPCS | Performed by: PHYSICIAN ASSISTANT

## 2020-08-25 PROCEDURE — 99213 OFFICE O/P EST LOW 20 MIN: CPT | Performed by: PHYSICIAN ASSISTANT

## 2020-08-25 RX ORDER — AMOXICILLIN AND CLAVULANATE POTASSIUM 875; 125 MG/1; MG/1
1 TABLET, FILM COATED ORAL 2 TIMES DAILY
Qty: 20 TABLET | Refills: 0 | Status: SHIPPED | OUTPATIENT
Start: 2020-08-25 | End: 2020-09-04

## 2020-08-25 ASSESSMENT — ENCOUNTER SYMPTOMS
SINUS PAIN: 1
SORE THROAT: 1
COUGH: 1
EYES NEGATIVE: 1
SHORTNESS OF BREATH: 1

## 2020-09-10 ENCOUNTER — NURSE TRIAGE (OUTPATIENT)
Dept: OTHER | Facility: CLINIC | Age: 55
End: 2020-09-10

## 2020-09-11 ENCOUNTER — VIRTUAL VISIT (OUTPATIENT)
Dept: FAMILY MEDICINE CLINIC | Age: 55
End: 2020-09-11
Payer: COMMERCIAL

## 2020-09-11 PROCEDURE — 99214 OFFICE O/P EST MOD 30 MIN: CPT | Performed by: FAMILY MEDICINE

## 2020-09-11 PROCEDURE — 1036F TOBACCO NON-USER: CPT | Performed by: FAMILY MEDICINE

## 2020-09-11 PROCEDURE — G8427 DOCREV CUR MEDS BY ELIG CLIN: HCPCS | Performed by: FAMILY MEDICINE

## 2020-09-11 PROCEDURE — 3017F COLORECTAL CA SCREEN DOC REV: CPT | Performed by: FAMILY MEDICINE

## 2020-09-11 PROCEDURE — G8417 CALC BMI ABV UP PARAM F/U: HCPCS | Performed by: FAMILY MEDICINE

## 2020-09-11 RX ORDER — BUDESONIDE AND FORMOTEROL FUMARATE DIHYDRATE 160; 4.5 UG/1; UG/1
2 AEROSOL RESPIRATORY (INHALATION) 2 TIMES DAILY
Qty: 1 INHALER | Refills: 5 | Status: SHIPPED | OUTPATIENT
Start: 2020-09-11 | End: 2020-12-18 | Stop reason: SDUPTHER

## 2020-09-11 RX ORDER — BENZONATATE 200 MG/1
200 CAPSULE ORAL 3 TIMES DAILY PRN
Qty: 30 CAPSULE | Refills: 0 | Status: SHIPPED | OUTPATIENT
Start: 2020-09-11 | End: 2020-09-18

## 2020-09-11 ASSESSMENT — ENCOUNTER SYMPTOMS
STRIDOR: 0
RHINORRHEA: 0
NAUSEA: 0
VOICE CHANGE: 0
EYE PAIN: 0
EYE DISCHARGE: 0
ABDOMINAL PAIN: 0
PHOTOPHOBIA: 0
CONSTIPATION: 0
CHEST TIGHTNESS: 1
WHEEZING: 1
EYE REDNESS: 0
SHORTNESS OF BREATH: 1
VOMITING: 0
APNEA: 0
COUGH: 1
EYE ITCHING: 0
DIARRHEA: 0
TROUBLE SWALLOWING: 0
SINUS PAIN: 0
SORE THROAT: 0
CHOKING: 0
SINUS PRESSURE: 0

## 2020-09-11 NOTE — PROGRESS NOTES
2020    TELEHEALTH EVALUATION -- Audio/Visual (During [facility]-30 public health emergency)    HPI:    Naman Villanueva (:  1965) has requested an audio/video evaluation for the following concern(s):    Chronic cough and SOB  Longstanding   Went to , felt like she couldn't breathe  Gets lightheaded and pressure headaches with SOB  Still feels like something is wrong in RUL, \"knot\"  If she laughs, it starts a coughing spell  Hx of histoplasmosis  No CP, PND, abd pain     Knows she needs to lose weight  Taking prilosec daily     Review of Systems   Constitutional: Positive for fatigue. Negative for activity change, appetite change, chills, diaphoresis and fever. HENT: Negative for congestion, dental problem, ear discharge, ear pain, hearing loss, postnasal drip, rhinorrhea, sinus pressure, sinus pain, sneezing, sore throat, tinnitus, trouble swallowing and voice change. Eyes: Negative for photophobia, pain, discharge, redness, itching and visual disturbance. Respiratory: Positive for cough, chest tightness, shortness of breath and wheezing. Negative for apnea, choking and stridor. Cardiovascular: Positive for palpitations. Negative for chest pain and leg swelling. Gastrointestinal: Negative for abdominal pain, constipation, diarrhea, nausea and vomiting. Genitourinary: Negative for decreased urine volume. Musculoskeletal: Negative for arthralgias, neck pain and neck stiffness. Skin: Negative for rash. Neurological: Positive for headaches. Psychiatric/Behavioral: Positive for sleep disturbance. Prior to Visit Medications    Medication Sig Taking?  Authorizing Provider   budesonide-formoterol (SYMBICORT) 160-4.5 MCG/ACT AERO Inhale 2 puffs into the lungs 2 times daily Yes Sebastian Frausto MD   benzonatate (TESSALON) 200 MG capsule Take 1 capsule by mouth 3 times daily as needed for Cough Yes Sebastian Frausto MD   levothyroxine (SYNTHROID) 137 MCG tablet Take 1 tablet by mouth once daily Yes Rohith Michelle MD   methocarbamol (ROBAXIN) 500 MG tablet Take 1 tablet by mouth 3 times daily as needed (muscle pain and spasm) Yes Rohith Michelle MD   naproxen (NAPROSYN) 250 MG tablet Take 2 tablets by mouth 2 times daily (with meals) Yes Rohith Michelle MD   Ascorbic Acid (VITAMIN C ER PO) Take by mouth Yes Historical Provider, MD   omeprazole (PRILOSEC) 40 MG delayed release capsule Take 1 capsule by mouth every morning (before breakfast) Yes Lesley Langford MD   sodium chloride (ALTAMIST SPRAY) 0.65 % nasal spray 1 spray by Nasal route as needed for Congestion Yes Lesley Langford MD   VENTOLIN  (90 Base) MCG/ACT inhaler Take 1 puff by mouth every 4 hours as needed Yes Historical Provider, MD   fluticasone (FLONASE) 50 MCG/ACT nasal spray 2 sprays by Nasal route daily Each nostril Yes Rohith Michelle MD   vitamin D (CHOLECALCIFEROL) 1000 UNIT TABS tablet Take 2,000 Units by mouth daily  Historical Provider, MD       Social History     Tobacco Use    Smoking status: Never Smoker    Smokeless tobacco: Never Used   Substance Use Topics    Alcohol use: Yes     Comment: drinks once a week    Drug use: No        Allergies   Allergen Reactions    Dust Mite Extract     Seasonal    ,   Past Medical History:   Diagnosis Date    Allergic rhinitis     Anxiety     can't sleep    Arthritis     Asthma     Dental disease     Dizziness     Fracture of nasal bone     GERD (gastroesophageal reflux disease)     Hearing loss     Tinnitus     TMJ dysfunction    ,   Past Surgical History:   Procedure Laterality Date    ANKLE SURGERY  2016    CHOLECYSTECTOMY, LAPAROSCOPIC  2008    MANDIBLE SURGERY      underbite    THYROIDECTOMY, PARTIAL Right 2003    hashimoto's on path, nodular to intiate    TUBAL LIGATION  1990    WRIST SURGERY  2015   ,   Social History     Tobacco Use    Smoking status: Never Smoker    Smokeless tobacco: Never Used   Substance Use Topics    Alcohol use:  Yes Comment: drinks once a week    Drug use: No   ,   Family History   Problem Relation Age of Onset    Diabetes Mother     Prostate Cancer Father         76s    Diabetes Paternal Uncle     Cancer Paternal Uncle     Prostate Cancer Paternal Uncle     Cancer Paternal Grandmother         stomach   ,   Immunization History   Administered Date(s) Administered    Influenza, Quadv, IM, (6 mo and older Fluzone, Flulaval, Fluarix and 3 yrs and older Afluria) 10/04/2018    Influenza, Quadv, IM, PF (6 mo and older Fluzone, Flulaval, Fluarix, and 3 yrs and older Afluria) 09/30/2019    Tdap (Boostrix, Adacel) 09/14/2018   ,   Health Maintenance   Topic Date Due    Shingles Vaccine (1 of 2) 06/20/2015    Colon cancer screen colonoscopy  06/20/2015    Breast cancer screen  03/03/2018    A1C test (Diabetic or Prediabetic)  02/20/2020    TSH testing  02/20/2020    Flu vaccine (1) 09/01/2020    Cervical cancer screen  02/18/2024    Lipid screen  02/20/2024    DTaP/Tdap/Td vaccine (2 - Td) 09/14/2028    Hepatitis C screen  Completed    HIV screen  Completed    Hepatitis A vaccine  Aged Out    Hepatitis B vaccine  Aged Out    Hib vaccine  Aged Out    Meningococcal (ACWY) vaccine  Aged Out    Pneumococcal 0-64 years Vaccine  Aged Out       PHYSICAL EXAMINATION:  [ INSTRUCTIONS:  \"[x]\" Indicates a positive item  \"[]\" Indicates a negative item  -- DELETE ALL ITEMS NOT EXAMINED]  Vital Signs: (As obtained by patient/caregiver or practitioner observation)    Blood pressure-  Heart rate-    Respiratory rate-    Temperature-  Pulse oximetry-     Constitutional: [x] Appears well-developed and well-nourished [x] No apparent distress      [] Abnormal-   Mental status  [x] Alert and awake  [] Oriented to person/place/time [x]Able to follow commands      Eyes:  EOM    [x]  Normal  [] Abnormal-  Sclera  [x]  Normal  [] Abnormal -         Discharge []  None visible  [] Abnormal -    HENT:   [x] Normocephalic, atraumatic.   [x] Abnormal   [] Mouth/Throat: Mucous membranes are moist.     External Ears [x] Normal  [] Abnormal-     Neck: [x] No visualized mass     Pulmonary/Chest: [x] Respiratory effort normal.  [x] No visualized signs of difficulty breathing or respiratory distress  Able to make complete sentences        [] Abnormal-      Musculoskeletal:   [] Normal gait with no signs of ataxia         [x] Normal range of motion of neck        [] Abnormal-       Neurological:        [x] No Facial Asymmetry (Cranial nerve 7 motor function) (limited exam to video visit)          [] No gaze palsy        [] Abnormal-         Skin:        [x] No significant exanthematous lesions or discoloration noted on facial skin         [] Abnormal-            Psychiatric:       [x] Normal Affect [] No Hallucinations        [] Abnormal-     Other pertinent observable physical exam findings-     ASSESSMENT/PLAN:  1. Shortness of breath  Will try to obtain previous records again  Had histoplasmosis in 2018  + tessalon pearls for Sx relief  Add controller inhaler  Has seen Dr. Kami Medeiros in the past, would prefer to see someone different for pulmonology   - XR CHEST STANDARD (2 VW); Future  - Spirometry With Bronchodilator; Future  - XR NECK SOFT TISSUE; Future  - Nolvia Kenny MD, Pulmonary, [facility]    2. Neck mass  Subjective. - XR NECK SOFT TISSUE; Future      Return if symptoms worsen or fail to improve. Aníbal Cabral is a 54 y.o. female being evaluated by a Virtual Visit (video visit) encounter to address concerns as mentioned above. A caregiver was present when appropriate. Due to this being a TeleHealth encounter (During [facility]-16 public health emergency), evaluation of the following organ systems was limited: Vitals/Constitutional/EENT/Resp/CV/GI//MS/Neuro/Skin/Heme-Lymph-Imm.   Pursuant to the emergency declaration under the [address] [facility] [place], [address] and Response Supplemental Appropriations Act, this Virtual Visit was conducted with patient's (and/or legal guardian's) consent, to reduce the patient's risk of exposure to COVID-19 and provide necessary medical care. The patient (and/or legal guardian) has also been advised to contact this office for worsening conditions or problems, and seek emergency medical treatment and/or call 911 if deemed necessary. Services were provided through a video synchronous discussion virtually to substitute for in-person clinic visit. Patient and provider were located at their individual homes. --Kishor Dixon MD on 9/11/2020 at 9:16 AM    An electronic signature was used to authenticate this note.

## 2020-09-14 ENCOUNTER — TELEPHONE (OUTPATIENT)
Dept: FAMILY MEDICINE CLINIC | Age: 55
End: 2020-09-14

## 2020-09-16 ENCOUNTER — HOSPITAL ENCOUNTER (OUTPATIENT)
Dept: GENERAL RADIOLOGY | Age: 55
Discharge: HOME | End: 2020-09-16
Payer: COMMERCIAL

## 2020-09-16 ENCOUNTER — HOSPITAL ENCOUNTER (OUTPATIENT)
Age: 55
Discharge: HOME | End: 2020-09-16
Payer: COMMERCIAL

## 2020-09-16 PROCEDURE — 70360 X-RAY EXAM OF NECK: CPT

## 2020-09-16 PROCEDURE — 71046 X-RAY EXAM CHEST 2 VIEWS: CPT

## 2020-11-11 ENCOUNTER — TELEPHONE (OUTPATIENT)
Dept: FAMILY MEDICINE CLINIC | Age: 55
End: 2020-11-11

## 2020-11-13 NOTE — TELEPHONE ENCOUNTER
Pt wanted to make Dr. Jinny Phillips and Rolo Raya aware that [facility] paperwork is being sent over. Last visit was 9/11/2020 & 8/25/20 for sick visit . I told pt she may need an appointment. I gave pt our office email to send over the paperwork.  Pt aware that you call and let her know if she needs an appointment

## 2020-11-18 NOTE — TELEPHONE ENCOUNTER
Patient informed that [place] paper work ready. Patient will come in and sign form. Then please fax it to [address] [place] [address]

## 2020-12-18 ENCOUNTER — VIRTUAL VISIT (OUTPATIENT)
Dept: FAMILY MEDICINE CLINIC | Age: 55
End: 2020-12-18
Payer: COMMERCIAL

## 2020-12-18 ENCOUNTER — TELEPHONE (OUTPATIENT)
Dept: FAMILY MEDICINE CLINIC | Age: 55
End: 2020-12-18

## 2020-12-18 PROCEDURE — G8417 CALC BMI ABV UP PARAM F/U: HCPCS | Performed by: FAMILY MEDICINE

## 2020-12-18 PROCEDURE — 99214 OFFICE O/P EST MOD 30 MIN: CPT | Performed by: FAMILY MEDICINE

## 2020-12-18 PROCEDURE — G8484 FLU IMMUNIZE NO ADMIN: HCPCS | Performed by: FAMILY MEDICINE

## 2020-12-18 PROCEDURE — 1036F TOBACCO NON-USER: CPT | Performed by: FAMILY MEDICINE

## 2020-12-18 PROCEDURE — 3017F COLORECTAL CA SCREEN DOC REV: CPT | Performed by: FAMILY MEDICINE

## 2020-12-18 PROCEDURE — G8427 DOCREV CUR MEDS BY ELIG CLIN: HCPCS | Performed by: FAMILY MEDICINE

## 2020-12-18 RX ORDER — PREDNISONE 50 MG/1
50 TABLET ORAL DAILY
Qty: 5 TABLET | Refills: 0 | Status: SHIPPED | OUTPATIENT
Start: 2020-12-18 | End: 2020-12-23

## 2020-12-18 RX ORDER — BUDESONIDE AND FORMOTEROL FUMARATE DIHYDRATE 160; 4.5 UG/1; UG/1
2 AEROSOL RESPIRATORY (INHALATION) 2 TIMES DAILY
Qty: 1 INHALER | Refills: 5 | Status: SHIPPED | OUTPATIENT
Start: 2020-12-18

## 2020-12-18 RX ORDER — BENZONATATE 100 MG/1
100-200 CAPSULE ORAL 3 TIMES DAILY PRN
Qty: 60 CAPSULE | Refills: 0 | Status: SHIPPED | OUTPATIENT
Start: 2020-12-18 | End: 2020-12-25

## 2020-12-18 RX ORDER — AZITHROMYCIN 250 MG/1
250 TABLET, FILM COATED ORAL SEE ADMIN INSTRUCTIONS
Qty: 6 TABLET | Refills: 0 | Status: SHIPPED | OUTPATIENT
Start: 2020-12-18 | End: 2020-12-23

## 2020-12-18 RX ORDER — GUAIFENESIN AND CODEINE PHOSPHATE 100; 10 MG/5ML; MG/5ML
5 SOLUTION ORAL EVERY 4 HOURS PRN
Qty: 118 ML | Refills: 0 | Status: SHIPPED | OUTPATIENT
Start: 2020-12-18 | End: 2020-12-21

## 2020-12-18 ASSESSMENT — ENCOUNTER SYMPTOMS
EYE REDNESS: 0
VOMITING: 0
APNEA: 0
EYE ITCHING: 0
SHORTNESS OF BREATH: 1
SORE THROAT: 0
RHINORRHEA: 1
WHEEZING: 1
SINUS PAIN: 0
NAUSEA: 0
CHEST TIGHTNESS: 1
TROUBLE SWALLOWING: 0
SINUS PRESSURE: 1
DIARRHEA: 0
EYE PAIN: 0
PHOTOPHOBIA: 0
COUGH: 1
CONSTIPATION: 0
VOICE CHANGE: 0
EYE DISCHARGE: 0
CHOKING: 0
ABDOMINAL PAIN: 0
STRIDOR: 0

## 2020-12-18 NOTE — PROGRESS NOTES
Past Surgical History:   Procedure Laterality Date    ANKLE SURGERY  2016    CHOLECYSTECTOMY, LAPAROSCOPIC  2008    MANDIBLE SURGERY      underbite    THYROIDECTOMY, PARTIAL Right 2003    hashimoto's on path, nodular to intiate    TUBAL LIGATION  1990    WRIST SURGERY  2015   ,   Social History     Tobacco Use    Smoking status: Never Smoker    Smokeless tobacco: Never Used   Substance Use Topics    Alcohol use: Yes     Comment: drinks once a week    Drug use: No   ,   Family History   Problem Relation Age of Onset    Diabetes Mother     Prostate Cancer Father         76s    Diabetes Paternal Uncle     Cancer Paternal Uncle     Prostate Cancer Paternal Uncle     Cancer Paternal Grandmother         stomach   ,   Immunization History   Administered Date(s) Administered    Influenza, Quadv, IM, (6 mo and older Fluzone, Flulaval, Fluarix and 3 yrs and older Afluria) 10/04/2018    Influenza, Quadv, IM, PF (6 mo and older Fluzone, Flulaval, Fluarix, and 3 yrs and older Afluria) 09/30/2019    Tdap (Boostrix, Adacel) 09/14/2018   ,   Health Maintenance   Topic Date Due    Shingles Vaccine (1 of 2) 06/20/2015    Colon cancer screen colonoscopy  06/20/2015    Breast cancer screen  03/03/2018    A1C test (Diabetic or Prediabetic)  02/20/2020    TSH testing  02/20/2020    Flu vaccine (1) 09/01/2020    Cervical cancer screen  02/18/2024    Lipid screen  02/20/2024    DTaP/Tdap/Td vaccine (2 - Td) 09/14/2028    Hepatitis C screen  Completed    HIV screen  Completed    Hepatitis A vaccine  Aged Out    Hepatitis B vaccine  Aged Out    Hib vaccine  Aged Out    Meningococcal (ACWY) vaccine  Aged Out    Pneumococcal 0-64 years Vaccine  Aged Out       PHYSICAL EXAMINATION:  [ INSTRUCTIONS:  \"[x]\" Indicates a positive item  \"[]\" Indicates a negative item  -- DELETE ALL ITEMS NOT EXAMINED]  Vital Signs: (As obtained by patient/caregiver or practitioner observation) Blood pressure-  Heart rate-    Respiratory rate-    Temperature-  Pulse oximetry-     Constitutional: [x] Appears well-developed and well-nourished [x] No apparent distress      [] Abnormal-   Mental status  [x] Alert and awake  [] Oriented to person/place/time [x]Able to follow commands      Eyes:  EOM    [x]  Normal  [] Abnormal-  Sclera  [x]  Normal  [] Abnormal -         Discharge []  None visible  [] Abnormal -    HENT:   [x] Normocephalic, atraumatic. [x] Abnormal   [] Mouth/Throat: Mucous membranes are moist.     External Ears [x] Normal  [] Abnormal-     Neck: [x] No visualized mass     Pulmonary/Chest: [x] Respiratory effort normal.  [x] No visualized signs of difficulty breathing or respiratory distress        [] Abnormal-      Musculoskeletal:   [] Normal gait with no signs of ataxia         [x] Normal range of motion of neck        [] Abnormal-       Neurological:        [x] No Facial Asymmetry (Cranial nerve 7 motor function) (limited exam to video visit)          [] No gaze palsy        [] Abnormal-         Skin:        [x] No significant exanthematous lesions or discoloration noted on facial skin         [] Abnormal-            Psychiatric:       [x] Normal Affect [] No Hallucinations        [] Abnormal-     Other pertinent observable physical exam findings-     ASSESSMENT/PLAN:  1.  COVID-19 Pt tested positive for COVID 19 on 12/1 and the significance of this diagnosis was discussed in detail. We discussed epidemiology and pathophysiology of this disease. Pt is on Day 22 since onset of Sxs. I discussed when course is likely to worsen. I encouraged pt to monitor for Sxs changes closely, obtain a pulse ox and call with O2 sats consistently <92%, and to avoid work/going out into public for 14 days, at minimum. I reviewed ER criteria: worsening SOB, new CP, uncontrollable fevers, near syncope, etc. I encouraged ASA 81 mg daily (if no contraindications) and supplementation, and reviewed the limited data: Vitamin C, Vitamin D +/- Zinc supplementation, based on health status. Antibiotics, if prescribed, were to assist with any potential bacterial infection. - budesonide-formoterol (SYMBICORT) 160-4.5 MCG/ACT AERO; Inhale 2 puffs into the lungs 2 times daily  Dispense: 1 Inhaler; Refill: 5  - azithromycin (ZITHROMAX) 250 MG tablet; Take 1 tablet by mouth See Admin Instructions for 5 days 500mg on day 1 followed by 250mg on days 2 - 5  Dispense: 6 tablet; Refill: 0  - predniSONE (DELTASONE) 50 MG tablet; Take 1 tablet by mouth daily for 5 days  Dispense: 5 tablet; Refill: 0  - guaiFENesin-codeine (CHERATUSSIN AC) 100-10 MG/5ML syrup; Take 5 mLs by mouth every 4 hours as needed for Cough for up to 3 days. Dispense: 118 mL; Refill: 0  -tessalon pearls sent to pharmacy     Return if symptoms worsen or fail to improve. Mathew Rodrigez is a 54 y.o. female being evaluated by a Virtual Visit (video visit) encounter to address concerns as mentioned above. A caregiver was present when appropriate. Due to this being a TeleHealth encounter ([facility]), evaluation of the following organ systems was limited: Vitals/Constitutional/EENT/Resp/CV/GI//MS/Neuro/Skin/Heme-Lymph-Imm. Pursuant to the emergency declaration under the [address] [place] [address] and the [employer] Act, this Virtual Visit was conducted with patient's (and/or legal guardian's) consent, to reduce the patient's risk of exposure to COVID-19 and provide necessary medical care. The patient (and/or legal guardian) has also been advised to contact this office for worsening conditions or problems, and seek emergency medical treatment and/or call 911 if deemed necessary. Services were provided through a video synchronous discussion virtually to substitute for in-person clinic visit. Patient and provider were located at their individual homes. --Jose Helm MD on 12/18/2020 at 4:40 PM    An electronic signature was used to authenticate this note.

## 2020-12-18 NOTE — TELEPHONE ENCOUNTER
Pt went to [facility] on 12/01 tested + for Covid . Pt taking vit d [facility] [place] /vit c as requested by Urgent care. She is still having coughing spells that is causing tightness in the chest. She is wanting to know if you can prescribe her a rx for a cough medication?

## 2020-12-18 NOTE — TELEPHONE ENCOUNTER
Please let the pt know that I am helping Dr. Dayton Franklin with her messages, but she will still be able to review this message. Typically we do not like to prescribe medications without seeing the pt first.  Unfortunately our [address] is not open today or Monday. Is there anyway that the pt would be willing to do a VV with Dr. Dayton Franklin or myself today? Thank you.

## 2020-12-21 ENCOUNTER — TELEPHONE (OUTPATIENT)
Dept: FAMILY MEDICINE CLINIC | Age: 55
End: 2020-12-21

## 2020-12-21 NOTE — TELEPHONE ENCOUNTER
Patient states that she needs a note to send to them and then they will send her paper work for Short term Disability, that they will need filled out. Right now she just needs the letter for them.

## 2020-12-21 NOTE — TELEPHONE ENCOUNTER
----- Message from George Abebe sent at 12/21/2020  2:02 PM EST -----  Subject: Message to Provider    QUESTIONS  Information for Provider? Pt requesting note for short term disability   from covid - From December 1st through jan 4th when she returns from work. Would like to be contacted promptly.  ---------------------------------------------------------------------------  --------------  CALL BACK INFO  What is the best way for the office to contact you? OK to leave message on   voicemail  Preferred Call Back Phone Number? [phone]  ---------------------------------------------------------------------------  --------------  SCRIPT ANSWERS  Relationship to Patient?  Self

## 2020-12-21 NOTE — LETTER
[address] [facility] [address]  [facility] [address]  Phone: [phone]  Fax: [phone]    Kelley Bro MD        December 21, 2020     Patient: Alfred Rodriguez   YOB: 1965   Date of Visit: 12/21/2020       To Whom It May Concern:    Alfred Rodriguez is currently a patient under my care. It is my medical opinion that Sylvia Majano may return to work on 1/4/2020. She has been unable to work due to illness since 12/1/2020    If you have any questions or concerns, please don't hesitate to call.     Sincerely,          Kelley Bro MD

## 2020-12-21 NOTE — TELEPHONE ENCOUNTER
Usually short term disability paperwork is different from just a work note. Can you call and clarify exact needs? ? Thank you!

## 2020-12-22 NOTE — TELEPHONE ENCOUNTER
Patient informed that letter is ready.  Emailed letter to patient at [email or web address].

## 2020-12-22 NOTE — TELEPHONE ENCOUNTER
Pt states that she didn't receive letter in her email. She is requesting that it be sent again. She said she's checked her junk mail file as well and couldn't find it. [email or web address]

## 2020-12-23 ENCOUNTER — TELEPHONE (OUTPATIENT)
Dept: FAMILY MEDICINE CLINIC | Age: 55
End: 2020-12-23

## 2020-12-23 NOTE — LETTER
[address] [facility] [address]  [facility] [address]  Phone: [phone]  Fax: [phone]    Rodriguez Huerta MD        December 23, 2020      Darryle Orchard is currently a patient under my care. It is my medical opinion that Blanche Cantrell may return to work on 1/4/2021. She has been unable to work due to illness since 12/1/2020    If you have any questions or concerns, please don't hesitate to call.       Sincerely,          Rodriguez Huerta MD

## 2021-01-15 ENCOUNTER — TELEPHONE (OUTPATIENT)
Dept: FAMILY MEDICINE CLINIC | Age: 56
End: 2021-01-15

## 2021-01-15 DIAGNOSIS — E03.9 ACQUIRED HYPOTHYROIDISM: ICD-10-CM

## 2021-01-15 DIAGNOSIS — Z00.00 ANNUAL PHYSICAL EXAM: Primary | ICD-10-CM

## 2021-01-15 RX ORDER — LEVOTHYROXINE SODIUM 137 UG/1
TABLET ORAL
Qty: 90 TABLET | Refills: 0 | Status: SHIPPED | OUTPATIENT
Start: 2021-01-15 | End: 2021-07-26 | Stop reason: SDUPTHER

## 2021-01-15 NOTE — TELEPHONE ENCOUNTER
Spoke to patient, she says she does not need DM follow up appointment, she is not diabetic, you were just keeping an eye on her A1C. She is asking if she can just come in for the A1C and a thyroid blood test, then she could do a VV with you if needed. She will need refill of thyroid medication.

## 2021-01-15 NOTE — TELEPHONE ENCOUNTER
Routine panel ordered, as she is due for this in 2/21. Needs fasting 6-8 hours prior. Can make an afternoon VV 1 week after labs to review in detail. Please let her know. Thanks.

## 2021-01-15 NOTE — TELEPHONE ENCOUNTER
----- Message from Rafael Hansen sent at 1/15/2021  9:29 AM EST -----  Subject: Appointment Request    Reason for Call: Routine Existing Condition Follow Up (Diabetes)    QUESTIONS  Type of Appointment? Established Patient  Reason for appointment request? Available appointments did not meet   patient need  Additional Information for Provider? patient wants an in person appt   so that she can get her bloodwork done for her thyroid and AIC   she would like a late afternoon appt due to work.   ---------------------------------------------------------------------------  --------------  [address]  What is the best way for the office to contact you? OK to leave message on   voicemail  Preferred Call Back Phone Number? [phone]  ---------------------------------------------------------------------------  --------------  SCRIPT ANSWERS  Relationship to Patient? Self  Appointment reason? Well Care/Follow Ups  Select a Well Care/Follow Ups appointment reason? Adult Existing Condition   Follow Up [Diabetes   CHF   COPD   Hypertension/Blood Pressure Check]  (Is the patient requesting to be seen urgently for their symptoms?)? No  Is this follow up request related to routine Diabetes Management? Yes  Are you having any new concerns about your existing condition? No  Have you been diagnosed with   tested for   or told that you are suspected of having COVID-19 (Coronavirus)? Yes  Did your symptoms begin or have you been tested for COVID-19 in the last   10 days? No  Have you had a fever or taken medication to treat a fever within the past   3 days? No  Have you had a cough   shortness of breath or flu-like symptoms within the past 3 days? No  Do you currently have flu-like symptoms including fever or chills   cough   shortness of breath   or difficulty breathing   or new loss of taste or smell? No  (Service Expert  click yes below to proceed with CSR As Usual   Scheduling)?  Yes

## 2021-03-22 ENCOUNTER — TELEPHONE (OUTPATIENT)
Dept: FAMILY MEDICINE CLINIC | Age: 56
End: 2021-03-22

## 2021-03-22 ENCOUNTER — VIRTUAL VISIT (OUTPATIENT)
Dept: FAMILY MEDICINE CLINIC | Age: 56
End: 2021-03-22
Payer: COMMERCIAL

## 2021-03-22 DIAGNOSIS — G89.29 CHRONIC LEFT-SIDED LOW BACK PAIN WITH LEFT-SIDED SCIATICA: Primary | ICD-10-CM

## 2021-03-22 DIAGNOSIS — M54.42 CHRONIC LEFT-SIDED LOW BACK PAIN WITH LEFT-SIDED SCIATICA: Primary | ICD-10-CM

## 2021-03-22 PROCEDURE — 3017F COLORECTAL CA SCREEN DOC REV: CPT | Performed by: FAMILY MEDICINE

## 2021-03-22 PROCEDURE — G8427 DOCREV CUR MEDS BY ELIG CLIN: HCPCS | Performed by: FAMILY MEDICINE

## 2021-03-22 PROCEDURE — 99213 OFFICE O/P EST LOW 20 MIN: CPT | Performed by: FAMILY MEDICINE

## 2021-03-22 RX ORDER — METHOCARBAMOL 500 MG/1
500 TABLET, FILM COATED ORAL 3 TIMES DAILY PRN
Qty: 30 TABLET | Refills: 1 | Status: SHIPPED | OUTPATIENT
Start: 2021-03-22 | End: 2022-01-20 | Stop reason: SDUPTHER

## 2021-03-22 RX ORDER — NAPROXEN 500 MG/1
500 TABLET ORAL 2 TIMES DAILY WITH MEALS
Qty: 60 TABLET | Refills: 2 | Status: SHIPPED | OUTPATIENT
Start: 2021-03-22 | End: 2021-11-11 | Stop reason: SDUPTHER

## 2021-03-22 ASSESSMENT — ENCOUNTER SYMPTOMS: BACK PAIN: 1

## 2021-03-22 ASSESSMENT — PATIENT HEALTH QUESTIONNAIRE - PHQ9
SUM OF ALL RESPONSES TO PHQ QUESTIONS 1-9: 0
SUM OF ALL RESPONSES TO PHQ9 QUESTIONS 1 & 2: 0
2. FEELING DOWN, DEPRESSED OR HOPELESS: 0
1. LITTLE INTEREST OR PLEASURE IN DOING THINGS: 0

## 2021-03-22 NOTE — PATIENT INSTRUCTIONS
Patient Education        Sciatica: Exercises  Introduction  Here are some examples of typical rehabilitation exercises for your condition. Start each exercise slowly. Ease off the exercise if you start to have pain. Your doctor or physical therapist will tell you when you can start these exercises and which ones will work best for you. When you are not being active, find a comfortable position for rest. Some people are comfortable on the floor or a medium-firm bed with a small pillow under their head and another under their knees. Some people prefer to lie on their side with a pillow between their knees. Don't stay in one position for too long. Take short walks (10 to 20 minutes) every 2 to 3 hours. Avoid slopes, hills, and stairs until you feel better. Walk only distances you can manage without pain, especially leg pain. How to do the exercises  Back stretches   1. Get down on your hands and knees on the floor. 2. Relax your head and allow it to droop. Round your back up toward the ceiling until you feel a nice stretch in your upper, middle, and lower back. Hold this stretch for as long as it feels comfortable, or about 15 to 30 seconds. 3. Return to the starting position with a flat back while you are on your hands and knees. 4. Let your back sway by pressing your stomach toward the floor. Lift your buttocks toward the ceiling. 5. Hold this position for 15 to 30 seconds. 6. Repeat 2 to 4 times. Follow-up care is a key part of your treatment and safety. Be sure to make and go to all appointments, and call your doctor if you are having problems. It's also a good idea to know your test results and keep a list of the medicines you take. Where can you learn more? Go to [email or web address] and sign in to your [employer] account. Enter E796 in the CAIS box to learn more about \"Sciatica: Exercises. \"     If you do not have an account, please click on the \"Sign Up Now\" link.  Current as of: November 16, 2020               Content Version: 12.8  © 4603-4942 [employer]. Care instructions adapted under license by [facility] ([facility]). If you have questions about a medical condition or this instruction, always ask your healthcare professional. [address] any warranty or liability for your use of this information.

## 2021-03-22 NOTE — TELEPHONE ENCOUNTER
----- Message from [place] sent at 3/22/2021  8:46 AM EDT -----  Subject: Message to Provider    QUESTIONS  Information for Provider? Patient is having lower back pain. She would   like to have a VV if possible or a referral to figure out the pain if   possible.   ---------------------------------------------------------------------------  --------------  CALL BACK INFO  What is the best way for the office to contact you? OK to leave message on   voicemail  Preferred Call Back Phone Number? [phone]  ---------------------------------------------------------------------------  --------------  SCRIPT ANSWERS  Relationship to Patient?  Self

## 2021-03-30 ENCOUNTER — HOSPITAL ENCOUNTER (OUTPATIENT)
Dept: MRI IMAGING | Age: 56
Discharge: HOME | End: 2021-03-30
Payer: COMMERCIAL

## 2021-03-30 DIAGNOSIS — M54.42 CHRONIC LEFT-SIDED LOW BACK PAIN WITH LEFT-SIDED SCIATICA: ICD-10-CM

## 2021-03-30 DIAGNOSIS — G89.29 CHRONIC LEFT-SIDED LOW BACK PAIN WITH LEFT-SIDED SCIATICA: ICD-10-CM

## 2021-03-30 PROCEDURE — 72148 MRI LUMBAR SPINE W/O DYE: CPT

## 2021-04-05 ENCOUNTER — TELEPHONE (OUTPATIENT)
Dept: FAMILY MEDICINE CLINIC | Age: 56
End: 2021-04-05

## 2021-05-07 ENCOUNTER — HOSPITAL ENCOUNTER (OUTPATIENT)
Dept: WOMENS IMAGING | Age: 56
Discharge: HOME | End: 2021-05-07
Payer: COMMERCIAL

## 2021-05-07 VITALS — WEIGHT: 200 LBS | HEIGHT: 67 IN | BODY MASS INDEX: 31.39 KG/M2

## 2021-05-07 DIAGNOSIS — Z12.31 ENCOUNTER FOR SCREENING MAMMOGRAM FOR BREAST CANCER: ICD-10-CM

## 2021-05-07 DIAGNOSIS — Z00.00 ANNUAL PHYSICAL EXAM: ICD-10-CM

## 2021-05-07 LAB
A/G RATIO: 1.8 (ref 1.1–2.2)
ALBUMIN SERPL-MCNC: 4.5 G/DL (ref 3.4–5)
ALP BLD-CCNC: 67 U/L (ref 40–129)
ALT SERPL-CCNC: 12 U/L (ref 10–40)
ANION GAP SERPL CALCULATED.3IONS-SCNC: 11 MMOL/L (ref 3–16)
AST SERPL-CCNC: 15 U/L (ref 15–37)
BASOPHILS ABSOLUTE: 0 K/UL (ref 0–0.2)
BASOPHILS RELATIVE PERCENT: 0.6 %
BILIRUB SERPL-MCNC: 0.3 MG/DL (ref 0–1)
BUN BLDV-MCNC: 11 MG/DL (ref 7–20)
CALCIUM SERPL-MCNC: 9.5 MG/DL (ref 8.3–10.6)
CHLORIDE BLD-SCNC: 103 MMOL/L (ref 99–110)
CHOLESTEROL, TOTAL: 184 MG/DL (ref 0–199)
CO2: 26 MMOL/L (ref 21–32)
CREAT SERPL-MCNC: 0.6 MG/DL (ref 0.6–1.1)
EOSINOPHILS ABSOLUTE: 0.2 K/UL (ref 0–0.6)
EOSINOPHILS RELATIVE PERCENT: 3.3 %
GFR AFRICAN AMERICAN: >60
GFR NON-AFRICAN AMERICAN: >60
GLOBULIN: 2.5 G/DL
GLUCOSE BLD-MCNC: 86 MG/DL (ref 70–99)
HCT VFR BLD CALC: 37.5 % (ref 36–48)
HDLC SERPL-MCNC: 66 MG/DL (ref 40–60)
HEMOGLOBIN: 12.6 G/DL (ref 12–16)
LDL CHOLESTEROL CALCULATED: 103 MG/DL
LYMPHOCYTES ABSOLUTE: 1.7 K/UL (ref 1–5.1)
LYMPHOCYTES RELATIVE PERCENT: 31.8 %
MCH RBC QN AUTO: 31.8 PG (ref 26–34)
MCHC RBC AUTO-ENTMCNC: 33.5 G/DL (ref 31–36)
MCV RBC AUTO: 94.9 FL (ref 80–100)
MONOCYTES ABSOLUTE: 0.5 K/UL (ref 0–1.3)
MONOCYTES RELATIVE PERCENT: 9.1 %
NEUTROPHILS ABSOLUTE: 3 K/UL (ref 1.7–7.7)
NEUTROPHILS RELATIVE PERCENT: 55.2 %
PDW BLD-RTO: 13.3 % (ref 12.4–15.4)
PLATELET # BLD: 226 K/UL (ref 135–450)
PMV BLD AUTO: 10 FL (ref 5–10.5)
POTASSIUM SERPL-SCNC: 4.9 MMOL/L (ref 3.5–5.1)
RBC # BLD: 3.95 M/UL (ref 4–5.2)
SODIUM BLD-SCNC: 140 MMOL/L (ref 136–145)
TOTAL PROTEIN: 7 G/DL (ref 6.4–8.2)
TRIGL SERPL-MCNC: 75 MG/DL (ref 0–150)
TSH REFLEX: 2.47 UIU/ML (ref 0.27–4.2)
VITAMIN D 25-HYDROXY: 26 NG/ML
VLDLC SERPL CALC-MCNC: 15 MG/DL
WBC # BLD: 5.4 K/UL (ref 4–11)

## 2021-05-07 PROCEDURE — 77063 BREAST TOMOSYNTHESIS BI: CPT

## 2021-05-08 LAB
ESTIMATED AVERAGE GLUCOSE: 111.2 MG/DL
HBA1C MFR BLD: 5.5 %

## 2021-05-10 ENCOUNTER — TELEPHONE (OUTPATIENT)
Dept: FAMILY MEDICINE CLINIC | Age: 56
End: 2021-05-10

## 2021-05-10 NOTE — TELEPHONE ENCOUNTER
----- Message from Florencio Duncan sent at 5/10/2021  1:24 PM EDT -----  Subject: Message to Provider    QUESTIONS  Information for Provider? Pt is calling about the [facility] paperwork for   taking care of Christina Galaviz. She is not sure if you want her to fax the paperwork   over or if Dr. Carissa Olvera would like to see them in person. ---------------------------------------------------------------------------  --------------  Orienseelizabeth [employer] DAGO  What is the best way for the office to contact you? OK to leave message on   voicemail  Preferred Call Back Phone Number? [phone]  ---------------------------------------------------------------------------  --------------  SCRIPT ANSWERS  Relationship to Patient?  Self

## 2021-07-26 DIAGNOSIS — E03.9 ACQUIRED HYPOTHYROIDISM: ICD-10-CM

## 2021-07-26 RX ORDER — LEVOTHYROXINE SODIUM 137 UG/1
TABLET ORAL
Qty: 90 TABLET | Refills: 1 | Status: SHIPPED | OUTPATIENT
Start: 2021-07-26 | End: 2022-01-20

## 2021-07-26 NOTE — TELEPHONE ENCOUNTER
----- Message from Bennett Osler sent at 7/26/2021  3:13 PM EDT -----  Subject: Refill Request    QUESTIONS  Name of Medication? levothyroxine (SYNTHROID) 137 MCG tablet  Patient-reported dosage and instructions? 137 MCG, 1 Daily  How many days do you have left? 0  Preferred Pharmacy? [id number]  Pharmacy phone number (if available)? [phone]  ---------------------------------------------------------------------------  --------------  Joanne LOZA  What is the best way for the office to contact you? OK to leave message on   voicemail  Preferred Call Back Phone Number?  [phone]

## 2021-09-29 ENCOUNTER — VIRTUAL VISIT (OUTPATIENT)
Dept: FAMILY MEDICINE CLINIC | Age: 56
End: 2021-09-29
Payer: COMMERCIAL

## 2021-09-29 DIAGNOSIS — R50.81 FEVER IN OTHER DISEASES: Primary | ICD-10-CM

## 2021-09-29 DIAGNOSIS — R53.83 FATIGUE, UNSPECIFIED TYPE: ICD-10-CM

## 2021-09-29 DIAGNOSIS — R05.9 COUGH: ICD-10-CM

## 2021-09-29 DIAGNOSIS — N90.89 FEMALE PERINEAL BLEEDING: ICD-10-CM

## 2021-09-29 PROCEDURE — 3017F COLORECTAL CA SCREEN DOC REV: CPT | Performed by: NURSE PRACTITIONER

## 2021-09-29 PROCEDURE — G8417 CALC BMI ABV UP PARAM F/U: HCPCS | Performed by: NURSE PRACTITIONER

## 2021-09-29 PROCEDURE — G8427 DOCREV CUR MEDS BY ELIG CLIN: HCPCS | Performed by: NURSE PRACTITIONER

## 2021-09-29 PROCEDURE — 99213 OFFICE O/P EST LOW 20 MIN: CPT | Performed by: NURSE PRACTITIONER

## 2021-09-29 PROCEDURE — 1036F TOBACCO NON-USER: CPT | Performed by: NURSE PRACTITIONER

## 2021-09-29 NOTE — PROGRESS NOTES
Kelly Augustine (:  1965) is a 64 y.o. female,Established patient, here for evaluation of the following chief complaint(s): Sinus Problem (covid negative but still having sx blood in urine started monday, sore throat, cough, headache, body aches, bloated ) and Letter for School/Work (off work today and wanting to go back tomorrow)         ASSESSMENT/PLAN:  1. Fever in other diseases  2. Fatigue, unspecified type  3. Cough  4. Female perineal bleeding    Omeprazole daily for stomach -has at home. Add mucinex DM to thin secretions. Increase water intake    Continue advil cold and sinus    Symptoms seem to be viral in nature. If changes or worsening contact office. Given symptoms started 4 days prior to covid test feel likely was accurate. Will need to be seen in the office for vaginal exam and urine evaluation. Agreeable. No follow-ups on file. SUBJECTIVE/OBJECTIVE:  HPI     Thursday started with body aching, cough, headache. Temp 97-99.  tested for covid and was negative. Daughter was positive for covid. Progressed to sore throat left side. Headache worsened. Stomach feeling bad. Drinking water, rest. Took advil cold and sinus. Today temp 99.  and Monday had blood in urine. In further discussion states the blood was noted on tissue when wiping after urination. Denies urine being red, dark. Thinks there might have been some blood in the water. Occurred on 2 different days. Denies pain with urination. Feels abdomen is bloated. Review of Systems   All other systems reviewed and are negative.       Patient-Reported Vitals 2021   Patient-Reported Weight 190   Patient-Reported Height 5 7   Patient-Reported Temperature 99.5        Physical Exam    [INSTRUCTIONS:  \"[x]\" Indicates a positive item  \"[]\" Indicates a negative item  -- DELETE ALL ITEMS NOT EXAMINED]    Constitutional: [x] Appears well-developed and well-nourished [x] No apparent distress      [] Abnormal - Mental status: [x] Alert and awake  [x] Oriented to person/place/time [x] Able to follow commands    [] Abnormal -     Eyes:   EOM    [x]  Normal    [] Abnormal -   Sclera  [x]  Normal    [] Abnormal -          Discharge [x]  None visible   [] Abnormal -     HENT: [x] Normocephalic, atraumatic  [] Abnormal -   [x] Mouth/Throat: Mucous membranes are moist    External Ears [x] Normal  [] Abnormal -    Neck: [x] No visualized mass [] Abnormal -     Pulmonary/Chest: [x] Respiratory effort normal   [x] No visualized signs of difficulty breathing or respiratory distress        [] Abnormal -      Musculoskeletal:   [x] Normal gait with no signs of ataxia         [x] Normal range of motion of neck        [] Abnormal -     Neurological:        [x] No Facial Asymmetry (Cranial nerve 7 motor function) (limited exam due to video visit)          [x] No gaze palsy        [] Abnormal -          Skin:        [x] No significant exanthematous lesions or discoloration noted on facial skin         [] Abnormal -            Psychiatric:       [x] Normal Affect [] Abnormal -        [x] No Hallucinations    Other pertinent observable physical exam findings:-                Adriano Albrecht, was evaluated through a synchronous (real-time) audio-video encounter. The patient (or guardian if applicable) is aware that this is a billable service. Verbal consent to proceed has been obtained within the past 12 months. The visit was conducted pursuant to the emergency declaration under the [address] [place] [address] and the [employer] and [employer] General Act. Patient identification was verified, and a caregiver was present when appropriate. The patient was located in a state where the provider was credentialed to provide care.       An electronic signature was used to authenticate this note.    --JOSLYN RENEE, WES - CNP

## 2021-09-29 NOTE — Clinical Note
She reported blood on paper when wiping after urination but not diffusely in the water. Concern for vaginal bleeding as source.  Do you want to see her or refer to Gyn?

## 2021-09-30 ENCOUNTER — TELEPHONE (OUTPATIENT)
Dept: FAMILY MEDICINE CLINIC | Age: 56
End: 2021-09-30

## 2021-09-30 NOTE — TELEPHONE ENCOUNTER
She should not return to work until temperature has resolved without the need for tylenol of advil to manage. Is she feeling better today?

## 2021-10-01 NOTE — TELEPHONE ENCOUNTER
Left message informing patient that the letter has been e-mailed. Ok per HIPAA. Advised to call back if she didn't receive it.

## 2021-10-06 ENCOUNTER — TELEPHONE (OUTPATIENT)
Dept: FAMILY MEDICINE CLINIC | Age: 56
End: 2021-10-06

## 2021-10-06 NOTE — TELEPHONE ENCOUNTER
Hello I have not spoken to patient but I wanted to be sure the date she can be scheduled is 10/11/21?

## 2021-10-06 NOTE — TELEPHONE ENCOUNTER
----- Message from Dwight Garcia MD sent at 9/30/2021 12:04 PM EDT -----  Thanks for letting me know. I can see her first to perform a pelvic exam. I'll have someone reach out to schedule. Sequanda: please schedule for a pelvic exam. Thank you   ----- Message -----  From: WES Grewal - CNP  Sent: 9/30/2021  11:42 AM EDT  To: Dwight Garcia MD    She reported blood on paper when wiping after urination but not diffusely in the water. Concern for vaginal bleeding as source.  Do you want to see her or refer to Gyn?

## 2021-10-07 NOTE — TELEPHONE ENCOUNTER
Pt is postmenopausal. Any vaginal bleeding at this stage is a concern. Needs visit to discuss more. Please continue with scheduling.  Thank you

## 2021-10-07 NOTE — TELEPHONE ENCOUNTER
Spoke to pt and she said that she cannot do that date that she works M-F till 4 but pt stated that she hasnt been having any trouble recently.

## 2021-11-11 DIAGNOSIS — G89.29 CHRONIC LEFT-SIDED LOW BACK PAIN WITH LEFT-SIDED SCIATICA: ICD-10-CM

## 2021-11-11 DIAGNOSIS — M54.42 CHRONIC LEFT-SIDED LOW BACK PAIN WITH LEFT-SIDED SCIATICA: ICD-10-CM

## 2021-11-11 RX ORDER — NAPROXEN 500 MG/1
500 TABLET ORAL 2 TIMES DAILY WITH MEALS
Qty: 60 TABLET | Refills: 2 | Status: SHIPPED | OUTPATIENT
Start: 2021-11-11

## 2021-11-11 NOTE — TELEPHONE ENCOUNTER
Refill Request     Last Seen: Last Seen Department: 9/29/2021  Last Seen by PCP: 12/18/2020    Last Written: 3/22/2021 #60 x 1    Next Appointment:   No future appointments.           Requested Prescriptions     Pending Prescriptions Disp Refills    naproxen (NAPROSYN) 500 MG tablet 60 tablet 2     Sig: Take 1 tablet by mouth 2 times daily (with meals) prn pain

## 2022-01-14 ENCOUNTER — TELEMEDICINE (OUTPATIENT)
Dept: PRIMARY CARE CLINIC | Age: 57
End: 2022-01-14
Payer: COMMERCIAL

## 2022-01-14 ENCOUNTER — TELEPHONE (OUTPATIENT)
Dept: PRIMARY CARE CLINIC | Age: 57
End: 2022-01-14

## 2022-01-14 ENCOUNTER — NURSE ONLY (OUTPATIENT)
Dept: FAMILY MEDICINE CLINIC | Age: 57
End: 2022-01-14

## 2022-01-14 DIAGNOSIS — J06.9 VIRAL UPPER RESPIRATORY TRACT INFECTION: Primary | ICD-10-CM

## 2022-01-14 DIAGNOSIS — Z20.822 CLOSE EXPOSURE TO COVID-19 VIRUS: ICD-10-CM

## 2022-01-14 DIAGNOSIS — J06.9 VIRAL UPPER RESPIRATORY TRACT INFECTION: ICD-10-CM

## 2022-01-14 LAB
INFLUENZA A ANTIBODY: NORMAL
INFLUENZA B ANTIBODY: NORMAL
SARS-COV-2: NOT DETECTED

## 2022-01-14 PROCEDURE — 3017F COLORECTAL CA SCREEN DOC REV: CPT | Performed by: NURSE PRACTITIONER

## 2022-01-14 PROCEDURE — 99214 OFFICE O/P EST MOD 30 MIN: CPT | Performed by: NURSE PRACTITIONER

## 2022-01-14 PROCEDURE — G8428 CUR MEDS NOT DOCUMENT: HCPCS | Performed by: NURSE PRACTITIONER

## 2022-01-14 PROCEDURE — 87804 INFLUENZA ASSAY W/OPTIC: CPT | Performed by: NURSE PRACTITIONER

## 2022-01-14 ASSESSMENT — ENCOUNTER SYMPTOMS
WHEEZING: 0
SORE THROAT: 0
SHORTNESS OF BREATH: 0
COUGH: 1
NAUSEA: 1
DIARRHEA: 0
VOMITING: 0
SINUS PRESSURE: 1

## 2022-01-14 NOTE — Clinical Note
I had the pleasure of seeing Rubina Graham today for a primary care virtualist video visit secondary to COVID Exposure, cough, congestion, chills, aches and nausea. I have provided the following recommendations: Please refer to note. I have included my note for your review and have asked the patient to follow up with you if there is worsening in her symptoms or there is no improvement. If you have questions, please reach out via [employer] secure messaging by searching for the [facility] Primary Care Virtualists. Your communication will be answered promptly by the Virtualist on service for the day. Additionally, we would love your overall feedback. Please click the following link to let us know if the Virtualist service met your expectations. [facility] Centerly[facility].[place]. [email or web address]      Electronically signed by WES Mendez CNP on 1/14/22 at 8:22 AM EST

## 2022-01-14 NOTE — PATIENT INSTRUCTIONS
Notify office if you do not improve or have worsening of condition. Take medication as prescribed. Take antibiotics medication until all gone. Drink plenty of fluids and rest.  Practice good hand hygiene. May sleep with humidifier as needed. Gargle with warm salt water 3-4 times a day to help with sore throat. You can use ice, warm chicken noodle soup, soft foods, popsicles and cough drops to help soothe your throat. May take Tylenol/Ibuprofen (alternate) as needed for fever/pain. Do not eat or drink after anyone. Do  not share utensils. After day 3 change out toothbrush to a new one. Cover your mouth and nose when you cough or sneeze. Notify office if you have no improvement or worsening of condition. Test for COVID and FLU. Per CDC guidelines, if you are exposed you should watch for symptoms and if you have symptoms, you should be tested and stay home away from others. If you test positive it is recommended you isolate at home for 10 days, wear a mask in public indoor settings for 14 days or until you receive a negative result. Recommend supplementation with Vitamin C, Vitamin D +/- Zinc supplementation, based on health status. Monitor symptoms and changes. We can take care of secondary infections if the need arises, most cases are mild and patients can manage symptoms with OTC medications ( Like Mucinex, Tylenol and/or Robitussin). If you have questions on those please do not hesitate to ask. Monitor for increased trouble breathing, chest pain, increased shortness of breath, monitor oxygen level at home (purchase pulse ox for home use) if in 80's notify office or report to ED, new confusion, inability to stay awake, blue color to lips or face should report to ED immediately.     Options they will consider include stopping quarantine   After day 10 without testing   After day 7 after receiving a negative test result (test must occur on day 5 or later)  In areas using options to reduce quarantine times, people who are asymptomatic can use a negative test result collected on day five (5) after exposure to exit quarantine on day seven (7), with additional self-monitoring. The day of exposure is considered day zero (0).

## 2022-01-20 DIAGNOSIS — E03.9 ACQUIRED HYPOTHYROIDISM: ICD-10-CM

## 2022-01-20 DIAGNOSIS — M54.42 CHRONIC LEFT-SIDED LOW BACK PAIN WITH LEFT-SIDED SCIATICA: ICD-10-CM

## 2022-01-20 DIAGNOSIS — G89.29 CHRONIC LEFT-SIDED LOW BACK PAIN WITH LEFT-SIDED SCIATICA: ICD-10-CM

## 2022-01-20 RX ORDER — METHOCARBAMOL 500 MG/1
500 TABLET, FILM COATED ORAL 3 TIMES DAILY PRN
Qty: 30 TABLET | Refills: 1 | Status: SHIPPED | OUTPATIENT
Start: 2022-01-20

## 2022-01-20 RX ORDER — LEVOTHYROXINE SODIUM 137 UG/1
TABLET ORAL
Qty: 90 TABLET | Refills: 1 | Status: SHIPPED | OUTPATIENT
Start: 2022-01-20 | End: 2022-05-05

## 2022-01-20 NOTE — TELEPHONE ENCOUNTER
Refill Request     Last Seen: Last Seen Department: 9/29/2021  Last Seen by PCP: 12/18/2020    Last Written: 3/22/2021    Next Appointment:   No future appointments.           Requested Prescriptions     Pending Prescriptions Disp Refills    methocarbamol (ROBAXIN) 500 MG tablet 30 tablet 1     Sig: Take 1 tablet by mouth 3 times daily as needed (pain) May cause drowsiness

## 2022-01-20 NOTE — TELEPHONE ENCOUNTER
Refill Request     Last Seen: Last Seen Department: 9/29/2021  Last Seen by PCP: 12/18/2020    Last Written: 7/26/2021    Next Appointment:   No future appointments. Message sent to the  to schedule.          Requested Prescriptions     Pending Prescriptions Disp Refills    levothyroxine (SYNTHROID) 137 MCG tablet [Pharmacy Med Name: LEVOTHYROXINE 137 MCG TABLET] 90 tablet 1     Sig: TAKE 1 TABLET BY MOUTH EVERY DAY

## 2022-03-17 ENCOUNTER — TELEMEDICINE (OUTPATIENT)
Dept: PRIMARY CARE CLINIC | Age: 57
End: 2022-03-17
Payer: COMMERCIAL

## 2022-03-17 DIAGNOSIS — R50.9 FEVER, UNSPECIFIED FEVER CAUSE: ICD-10-CM

## 2022-03-17 DIAGNOSIS — J06.9 VIRAL UPPER RESPIRATORY TRACT INFECTION: Primary | ICD-10-CM

## 2022-03-17 DIAGNOSIS — R11.0 NAUSEA: ICD-10-CM

## 2022-03-17 DIAGNOSIS — R52 BODY ACHES: ICD-10-CM

## 2022-03-17 DIAGNOSIS — R19.7 DIARRHEA, UNSPECIFIED TYPE: ICD-10-CM

## 2022-03-17 PROCEDURE — 3017F COLORECTAL CA SCREEN DOC REV: CPT | Performed by: NURSE PRACTITIONER

## 2022-03-17 PROCEDURE — G8427 DOCREV CUR MEDS BY ELIG CLIN: HCPCS | Performed by: NURSE PRACTITIONER

## 2022-03-17 PROCEDURE — 99213 OFFICE O/P EST LOW 20 MIN: CPT | Performed by: NURSE PRACTITIONER

## 2022-03-17 ASSESSMENT — ENCOUNTER SYMPTOMS
CHEST TIGHTNESS: 0
SINUS PRESSURE: 1
WHEEZING: 0
COUGH: 1
NAUSEA: 1
SHORTNESS OF BREATH: 0
DIARRHEA: 1

## 2022-03-17 NOTE — PATIENT INSTRUCTIONS
Test for COVID and FLU. If positive will need work note to reflect days off. Will follow up if sinus does not improve. Treat symptoms. May use Flonase. Antibiotics will not help viral illness. Notify office if you do not improve or have worsening of condition. Take medication as prescribed. Drink plenty of fluids and rest.  Practice good hand hygiene. May sleep with humidifier as needed. Gargle with warm salt water 3-4 times a day to help with sore throat. You can use ice, warm chicken noodle soup, soft foods, popsicles and cough drops to help soothe your throat. May take Tylenol/Ibuprofen (alternate) as needed for fever/pain. Do not eat or drink after anyone. Do  not share utensils. Cover your mouth and nose when you cough or sneeze. Per CDC guidelines,If COVID is positive. stay home away from others. If you test positive it is recommended you isolate at home for 10 days, wear a mask in public indoor settings for 14 days or until you receive a negative result. Recommend supplementation with Vitamin C, Vitamin D +/- Zinc supplementation, based on health status. Monitor symptoms and changes. We can take care of secondary infections if the need arises, most cases are mild and patients can manage symptoms with OTC medications ( Like Mucinex, Tylenol and/or Robitussin). If you have questions on those please do not hesitate to ask. Monitor for increased trouble breathing, chest pain, increased shortness of breath, monitor oxygen level at home (purchase pulse ox for home use) if in 80's notify office or report to ED, new confusion, inability to stay awake, blue color to lips or face should report to ED immediately.     Options they will consider include stopping quarantine   After day 10 without testing   After day 7 after receiving a negative test result (test must occur on day 5 or later)  In areas using options to reduce quarantine times, people who are asymptomatic can use a negative test result collected on day five (5) after exposure to exit quarantine on day seven (7), with additional self-monitoring. The day of exposure is considered day zero (0). Patient Education        Diarrhea: Care Instructions  Overview     Diarrhea is loose, watery stools (bowel movements). The exact cause is often hard to find. Sometimes diarrhea is your body's way of getting rid of what caused an upset stomach. Viruses, food poisoning, and many medicines can cause diarrhea. Some people get diarrhea in response to emotional stress, anxiety, or certain foods. Almost everyone has diarrhea now and then. It usually isn't serious, and your stools will return to normal soon. The important thing to do is replace the fluids you have lost, so you can prevent dehydration. The doctor has checked you carefully, but problems can develop later. If you notice any problems or new symptoms, get medical treatment right away. Follow-up care is a key part of your treatment and safety. Be sure to make and go to all appointments, and call your doctor if you are having problems. It's also a good idea to know your test results and keep a list of the medicines you take. How can you care for yourself at home? · Watch for signs of dehydration, which means your body has lost too much water. Dehydration is a serious condition and should be treated right away. Signs of dehydration are:  ? Increasing thirst and dry eyes and mouth. ? Feeling faint or lightheaded. ? A smaller amount of urine than normal.  · To prevent dehydration, drink plenty of fluids. Choose water and other clear liquids until you feel better. If you have kidney, heart, or liver disease and have to limit fluids, talk with your doctor before you increase the amount of fluids you drink. · When you feel like eating, start with small amounts of food. · The doctor may recommend that you take over-the-counter medicine, such as loperamide (Imodium).  Read and follow all instructions on the label. Do not use this medicine if you have bloody diarrhea, a high fever, or other signs of serious illness. Call your doctor if you think you are having a problem with your medicine. When should you call for help? Call 911 anytime you think you may need emergency care. For example, call if:    · You passed out (lost consciousness).     · Your stools are maroon or very bloody. Call your doctor now or seek immediate medical care if:    · You are dizzy or lightheaded, or you feel like you may faint.     · Your stools are black and look like tar, or they have streaks of blood.     · You have new or worse belly pain.     · You have symptoms of dehydration, such as:  ? Dry eyes and a dry mouth. ? Passing only a little urine. ? Cannot keep fluids down.     · You have a new or higher fever. Watch closely for changes in your health, and be sure to contact your doctor if:    · Your diarrhea is getting worse.     · You see pus in the diarrhea.     · You are not getting better after 2 days (48 hours). Where can you learn more? Go to [email or web address] and sign in to your Basecamp account. Enter V975 in the [facility] box to learn more about \"Diarrhea: Care Instructions. \"     If you do not have an account, please click on the \"Sign Up Now\" link. Current as of: July 1, 2021               Content Version: 13.1  © 2006-2021 [employer]. Care instructions adapted under license by [facility] ([facility]). If you have questions about a medical condition or this instruction, always ask your healthcare professional. [place] [address] any warranty or liability for your use of this information. Patient Education        Nausea and Vomiting: Care Instructions  Overview     When you are nauseated, you may feel weak and sweaty and notice a lot of saliva in your mouth. Nausea often leads to vomiting.  Most of the time you do not need to worry about nausea and vomiting, but they can be signs of other illnesses. Two common causes of nausea and vomiting are a stomach infection and food poisoning. Nausea and vomiting from a viral stomach infection will usually start to improve within 24 hours. Nausea and vomiting from food poisoning may last from 12 to 48 hours. The doctor has checked you carefully, but problems can develop later. If you notice any problems or new symptoms, get medical treatment right away. Follow-up care is a key part of your treatment and safety. Be sure to make and go to all appointments, and call your doctor if you are having problems. It's also a good idea to know your test results and keep a list of the medicines you take. How can you care for yourself at home? · To prevent dehydration, drink plenty of fluids. Choose water and other clear liquids until you feel better. If you have kidney, heart, or liver disease and have to limit fluids, talk with your doctor before you increase the amount of fluids you drink. · Rest in bed until you feel better. · When you are able to eat, try clear soups, mild foods, and liquids until all symptoms are gone for 12 to 48 hours. Other good choices include dry toast, crackers, cooked cereal, and gelatin dessert, such as Jell-O. When should you call for help? Call 911 anytime you think you may need emergency care. For example, call if:    · You passed out (lost consciousness). Call your doctor now or seek immediate medical care if:    · You have symptoms of dehydration, such as:  ? Dry eyes and a dry mouth. ? Passing only a little urine. ? Feeling thirstier than usual.     · You have new or worsening belly pain.     · You have a new or higher fever.     · You vomit blood or what looks like coffee grounds.    Watch closely for changes in your health, and be sure to contact your doctor if:    · You have ongoing nausea and vomiting.     · Your vomiting is getting worse.     · Your vomiting lasts longer than 2 days.     · You are not getting better as expected. Where can you learn more? Go to https://chpepiceweb.[email or web address] org and sign in to your Graphdive account. Enter 25 [id number] in the [facility] box to learn more about \"Nausea and Vomiting: Care Instructions. \"     If you do not have an account, please click on the \"Sign Up Now\" link. Current as of: July 1, 2021               Content Version: 13.1  © 2006-2021 [employer]. Care instructions adapted under license by [facility] ([facility]). If you have questions about a medical condition or this instruction, always ask your healthcare professional. [address] any warranty or liability for your use of this information.

## 2022-03-17 NOTE — PROGRESS NOTES
3/17/2022    TELEHEALTH EVALUATION -- Audio/Visual (During AMDAR-82 public health emergency)    Chief Complaint   Patient presents with    Cough    Fatigue    Nausea    Diarrhea    Fever    Chills    URI       HPI:    Walter Tim (:  1965) has requested an audio/video evaluation for the following concern(s):    Request visit for worsening symptoms feels related to FLU but work wants her tested for COVID as well. Patient describes fever, chills, body aches, cough, headache, and N/D. Patient has been around like illness/stomach flu at work but about a week ago having sinus issues due to weather change and feels it is all compounded. Headache improved slightly with Excedrin. Diarrhea improved some with Imodium. Nausea onglng but it tolerable and denies anything for this at this time. Fever is better today. Still with chills. Will send her to office to test for COVID and FLU. Monitor for possible sinusitis if no improvement. Review of Systems   Constitutional: Positive for chills, fatigue and fever. HENT: Positive for congestion, postnasal drip and sinus pressure. Respiratory: Positive for cough. Negative for chest tightness, shortness of breath and wheezing. Cardiovascular: Negative. Gastrointestinal: Positive for diarrhea and nausea. Genitourinary: Negative. Musculoskeletal: Positive for myalgias. Skin: Negative. Neurological: Positive for headaches. Psychiatric/Behavioral: Negative. Prior to Visit Medications    Medication Sig Taking?  Authorizing Provider   methocarbamol (ROBAXIN) 500 MG tablet Take 1 tablet by mouth 3 times daily as needed (pain) May cause drowsiness  Nathaly Sánchez MD   levothyroxine (SYNTHROID) 137 MCG tablet TAKE 1 TABLET BY MOUTH EVERY DAY  Nathaly Sánchez MD   naproxen (NAPROSYN) 500 MG tablet Take 1 tablet by mouth 2 times daily (with meals) prn pain  Nathaly Sánchez MD   budesonide-formoterol (SYMBICORT) 160-4.5 MCG/ACT AERO Inhale 2 puffs into the lungs 2 times daily  Gisella Echavarria MD   Ascorbic Acid (VITAMIN C ER PO) Take by mouth  Historical Provider, MD   sodium chloride (ALTAMIST SPRAY) 0.65 % nasal spray 1 spray by Nasal route as needed for Congestion  Cathy Huerta MD   vitamin D (CHOLECALCIFEROL) 1000 UNIT TABS tablet Take 2,000 Units by mouth daily  Historical Provider, MD   fluticasone (FLONASE) 50 MCG/ACT nasal spray 2 sprays by Nasal route daily Each nostril  Gisella Echavarria MD       Social History     Tobacco Use    Smoking status: Never Smoker    Smokeless tobacco: Never Used   Vaping Use    Vaping Use: Never used   Substance Use Topics    Alcohol use: Yes     Comment: drinks once a week    Drug use: No        Allergies   Allergen Reactions    Dust Mite Extract     Seasonal    ,   Past Medical History:   Diagnosis Date    Allergic rhinitis     Anxiety     can't sleep    Arthritis     Asthma     Dental disease     Dizziness     Fracture of nasal bone     GERD (gastroesophageal reflux disease)     Hearing loss     Tinnitus     TMJ dysfunction    ,   Past Surgical History:   Procedure Laterality Date    ANKLE SURGERY  2016    CHOLECYSTECTOMY, LAPAROSCOPIC  2008    MANDIBLE SURGERY      underbite    THYROIDECTOMY, PARTIAL Right 2003    hashimoto's on path, nodular to intiate   [facility] [place]  2015   ,   Social History     Tobacco Use    Smoking status: Never Smoker    Smokeless tobacco: Never Used   Vaping Use    Vaping Use: Never used   Substance Use Topics    Alcohol use: Yes     Comment: drinks once a week    Drug use: No   ,   Family History   Problem Relation Age of Onset    Diabetes Mother     Prostate Cancer Father         76s    Diabetes Paternal Uncle     Cancer Paternal Uncle     Prostate Cancer Paternal Uncle     Cancer Paternal Grandmother         stomach   ,   Immunization History   Administered Date(s) Administered    Influenza, Quadv, IM, (6 mo and older Fluzone, Flulaval, Fluarix and 3 yrs and older Afluria) 10/04/2018    Influenza, Quadv, IM, PF (6 mo and older Fluzone, Flulaval, Fluarix, and 3 yrs and older Afluria) 09/30/2019    Tdap (Boostrix, Adacel) 09/14/2018       PHYSICAL EXAMINATION:  [ INSTRUCTIONS:  \"[x]\" Indicates a positive item  \"[]\" Indicates a negative item  -- DELETE ALL ITEMS NOT EXAMINED]  Vital Signs: (As obtained by patient/caregiver or practitioner observation)    Blood pressure-  Heart rate-    Respiratory rate-    Temperature-  Pulse oximetry-     Constitutional: [x] Appears well-developed and well-nourished [x] No apparent distress      [x] Abnormal-  Appears ill   Mental status  [x] Alert and awake  [x] Oriented to person/place/time [x]Able to follow commands      Eyes:  EOM    [x]  Normal  [] Abnormal-  Sclera  [x]  Normal  [] Abnormal -         Discharge [x]  None visible  [] Abnormal -    HENT:   [x] Normocephalic, atraumatic. [] Abnormal   [] Mouth/Throat: Mucous membranes are moist.     External Ears [x] Normal  [] Abnormal-     Neck: [x] No visualized mass     Pulmonary/Chest: [x] Respiratory effort normal.  [x] No visualized signs of difficulty breathing or respiratory distress        [] Abnormal-      Musculoskeletal:   [] Normal gait with no signs of ataxia         [x] Normal range of motion of neck        [] Abnormal-       Neurological:        [x] No Facial Asymmetry (Cranial nerve 7 motor function) (limited exam to video visit)          [x] No gaze palsy        [] Abnormal-         Skin:        [x] No significant exanthematous lesions or discoloration noted on facial skin         [] Abnormal-            Psychiatric:       [x] Normal Affect [] No Hallucinations        [] Abnormal-     Other pertinent observable physical exam findings-     ASSESSMENT/PLAN:  1. Viral upper respiratory tract infection  Test for COVID and FLU. If positive will need work note to reflect days off. Will follow up if sinus does not improve.   Treat symptoms. May use Flonase. Antibiotics will not help viral illness. Notify office if you do not improve or have worsening of condition. Take medication as prescribed. Drink plenty of fluids and rest.  Practice good hand hygiene. May sleep with humidifier as needed. Gargle with warm salt water 3-4 times a day to help with sore throat. You can use ice, warm chicken noodle soup, soft foods, popsicles and cough drops to help soothe your throat. May take Tylenol/Ibuprofen (alternate) as needed for fever/pain. Do not eat or drink after anyone. Do  not share utensils. Cover your mouth and nose when you cough or sneeze. Per CDC guidelines,If COVID is positive. stay home away from others. If you test positive it is recommended you isolate at home for 10 days, wear a mask in public indoor settings for 14 days or until you receive a negative result. Recommend supplementation with Vitamin C, Vitamin D +/- Zinc supplementation, based on health status. Monitor symptoms and changes. We can take care of secondary infections if the need arises, most cases are mild and patients can manage symptoms with OTC medications ( Like Mucinex, Tylenol and/or Robitussin). If you have questions on those please do not hesitate to ask. Monitor for increased trouble breathing, chest pain, increased shortness of breath, monitor oxygen level at home (purchase pulse ox for home use) if in 80's notify office or report to ED, new confusion, inability to stay awake, blue color to lips or face should report to ED immediately. Options they will consider include stopping quarantine   After day 10 without testing   After day 7 after receiving a negative test result (test must occur on day 5 or later)  In areas using options to reduce quarantine times, people who are asymptomatic can use a negative test result collected on day five (5) after exposure to exit quarantine on day seven (7), with additional self-monitoring.  The day of exposure is considered day zero (0).        - POCT Influenza A/B; Future  - Covid-19 Ambulatory; Future    2. Diarrhea, unspecified type  Notify office if you have no improvement or worsening of condition. May continue use of imodium as directed. Please refer to educational handout. · To prevent dehydration, drink plenty of fluids. Choose water and other clear liquids until you feel better. If you have kidney, heart, or liver disease and have to limit fluids, talk with your doctor before you increase the amount of fluids you drink. · When you feel like eating, start with small amounts of food. · The doctor may recommend that you take over-the-counter medicine, such as loperamide (Imodium). Read and follow all instructions on the label. Do not use this medicine if you have bloody diarrhea, a high fever, or other signs of serious illness.    - POCT Influenza A/B; Future  - Covid-19 Ambulatory; Future    3. Nausea  Notify office if you have no improvement or worsening of condition. May continue use of imodium as directed. Please refer to educational handout. · To prevent dehydration, drink plenty of fluids. Choose water and other clear liquids until you feel better. If you have kidney, heart, or liver disease and have to limit fluids, talk with your doctor before you increase the amount of fluids you drink. · When you are able to eat, try clear soups, mild foods, and liquids until all symptoms are gone for 12 to 48 hours. Other good choices include dry toast, crackers, cooked cereal, and gelatin dessert, such as Jell-O.    - POCT Influenza A/B; Future  - Covid-19 Ambulatory; Future    4. Body aches  Drink plenty of fluids and rest.  May take Tylenol/Ibuprofen (alternate) as needed for fever/pain. - POCT Influenza A/B; Future  - Covid-19 Ambulatory; Future    5. Fever, unspecified fever cause  Drink plenty of fluids and rest.  May take Tylenol/Ibuprofen (alternate) as needed for fever/pain.     - POCT

## 2022-03-18 ENCOUNTER — NURSE ONLY (OUTPATIENT)
Dept: FAMILY MEDICINE CLINIC | Age: 57
End: 2022-03-18
Payer: COMMERCIAL

## 2022-03-18 DIAGNOSIS — R50.9 FEVER, UNSPECIFIED FEVER CAUSE: ICD-10-CM

## 2022-03-18 DIAGNOSIS — R52 BODY ACHES: ICD-10-CM

## 2022-03-18 DIAGNOSIS — R19.7 DIARRHEA, UNSPECIFIED TYPE: ICD-10-CM

## 2022-03-18 DIAGNOSIS — J06.9 VIRAL UPPER RESPIRATORY TRACT INFECTION: ICD-10-CM

## 2022-03-18 DIAGNOSIS — R11.0 NAUSEA: ICD-10-CM

## 2022-03-18 LAB
INFLUENZA A ANTIBODY: NEGATIVE
INFLUENZA B ANTIBODY: NEGATIVE

## 2022-03-18 PROCEDURE — 87804 INFLUENZA ASSAY W/OPTIC: CPT | Performed by: NURSE PRACTITIONER

## 2022-03-20 LAB — SARS-COV-2: NOT DETECTED

## 2022-05-04 ENCOUNTER — APPOINTMENT (OUTPATIENT)
Dept: GENERAL RADIOLOGY | Age: 57
End: 2022-05-04
Payer: COMMERCIAL

## 2022-05-04 ENCOUNTER — NURSE TRIAGE (OUTPATIENT)
Dept: OTHER | Facility: CLINIC | Age: 57
End: 2022-05-04

## 2022-05-04 ENCOUNTER — HOSPITAL ENCOUNTER (EMERGENCY)
Age: 57
Discharge: HOME | End: 2022-05-04
Payer: COMMERCIAL

## 2022-05-04 VITALS
OXYGEN SATURATION: 99 % | SYSTOLIC BLOOD PRESSURE: 160 MMHG | RESPIRATION RATE: 16 BRPM | HEART RATE: 75 BPM | DIASTOLIC BLOOD PRESSURE: 94 MMHG | TEMPERATURE: 97.5 F

## 2022-05-04 DIAGNOSIS — M62.838 TRAPEZIUS MUSCLE SPASM: Primary | ICD-10-CM

## 2022-05-04 PROCEDURE — 73000 X-RAY EXAM OF COLLAR BONE: CPT

## 2022-05-04 PROCEDURE — 99283 EMERGENCY DEPT VISIT LOW MDM: CPT

## 2022-05-04 PROCEDURE — 6370000000 HC RX 637 (ALT 250 FOR IP): Performed by: REGISTERED NURSE

## 2022-05-04 PROCEDURE — 73030 X-RAY EXAM OF SHOULDER: CPT

## 2022-05-04 RX ORDER — LIDOCAINE 4 G/G
1 PATCH TOPICAL ONCE
Status: DISCONTINUED | OUTPATIENT
Start: 2022-05-04 | End: 2022-05-04 | Stop reason: HOSPADM

## 2022-05-04 RX ORDER — LIDOCAINE 50 MG/G
1 PATCH TOPICAL DAILY
Qty: 10 PATCH | Refills: 0 | Status: SHIPPED | OUTPATIENT
Start: 2022-05-04 | End: 2022-05-14

## 2022-05-04 ASSESSMENT — ENCOUNTER SYMPTOMS
BACK PAIN: 0
SHORTNESS OF BREATH: 0
VOMITING: 0
RHINORRHEA: 0
ABDOMINAL PAIN: 0
NAUSEA: 0
COUGH: 0
SORE THROAT: 0
DIARRHEA: 0
CONSTIPATION: 0

## 2022-05-04 ASSESSMENT — PAIN DESCRIPTION - PAIN TYPE: TYPE: ACUTE PAIN

## 2022-05-04 ASSESSMENT — PAIN DESCRIPTION - DESCRIPTORS: DESCRIPTORS: ACHING;GNAWING

## 2022-05-04 ASSESSMENT — PAIN DESCRIPTION - LOCATION: LOCATION: SHOULDER;NECK

## 2022-05-04 ASSESSMENT — PAIN SCALES - GENERAL: PAINLEVEL_OUTOF10: 7

## 2022-05-04 ASSESSMENT — PAIN - FUNCTIONAL ASSESSMENT: PAIN_FUNCTIONAL_ASSESSMENT: 0-10

## 2022-05-04 ASSESSMENT — PAIN DESCRIPTION - ORIENTATION: ORIENTATION: LEFT

## 2022-05-04 NOTE — ED PROVIDER NOTES
[address] [facility]  EMERGENCY DEPARTMENT ENCOUNTER        Pt Name: Martin Solis  MRN: [id number]  [place] 1965  Date of evaluation: 5/4/2022  Provider: WES Herrera - CNP  PCP: Willis Manning MD    This patient was not seen and evaluated by the attending physician. I have evaluated this patient. My supervising physician was available for consultation. 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 Pain increased with palpation       HISTORY OF PRESENT ILLNESS   (Location/Symptom, Timing/Onset, Context/Setting, Quality, Duration, Modifying Factors, Severity)  Note limiting factors. Martin Solis is a 64 y.o. female who presents via private car for  Evaluation of neck, shoulder and clavicle pain. Onset was 1 week ago. Duration has been since the onset. Context includes patient reports she was at work 1 week ago and picked up a heavy box and felt a \"pop in my left collarbone\". 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 She reports continuous pain since the onset with no limits in range of motion or numbness and tingling. She states she has been using heat, ice, naproxen and Robaxin at home with no improvement of the pain. She is worried that she has \"pulled or torn something\". She denies any chest pain or shortness of breath and denies any fevers. Quality is \"gnawing\", throbbing, aching  with radiation from her left neck to her left shoulder and clavicle. Alleviating factors include putting her hands behind her head and \"stretching\" her shoulders. She states this relieves the pain. Aggravating factors include movement. Pain is 7/10. Naproxen, Ice, Heat, Robaxin has been used for pain today. Chart review reveals pt has significant PMHx of anxiety, arthritis, asthma, GERD. They take . Nursing Notes were all reviewed and agreed with or any disagreements were addressed  in the HPI Robaxin, Synthroid, naproxen, Symbicort, Flonase, vitamin D. Pt was seen during the [place] 19 pandemic. Appropriate PPE worn by ME during patient encounters. Pt seen during a time with constrained hospital bed capacity and other potential inpatient and outpatient resources were constrained due to the viral pandemic. REVIEW OF SYSTEMS    (2-9 systems for level 4, 10 or more for level 5)     Review of Systems   Constitutional: Negative for chills, diaphoresis and fever. HENT: Negative for congestion, rhinorrhea and sore throat. Respiratory: Negative for cough and shortness of breath. Cardiovascular: Negative for chest pain and leg swelling. Gastrointestinal: Negative for abdominal pain, constipation, diarrhea, nausea and vomiting. Musculoskeletal: Positive for myalgias and neck pain. Negative for back pain. Pain to L shoulder, clavicle and left neck   Skin: Negative for rash and wound. Neurological: Negative for dizziness and light-headedness. Positives and Pertinent negatives as per HPI. Except as noted abovein the ROS, all other systems were reviewed and negative.        PAST MEDICAL HISTORY     Past Medical History:   Diagnosis Date    Allergic rhinitis     Anxiety     can't sleep    Arthritis     Asthma     Dental disease     Dizziness     Fracture of nasal bone     GERD (gastroesophageal reflux disease)     Hearing loss     Tinnitus     TMJ dysfunction          SURGICAL HISTORY     Past Surgical History:   Procedure Laterality Date    ANKLE SURGERY  2016    CHOLECYSTECTOMY, LAPAROSCOPIC  2008    MANDIBLE SURGERY      underbite    THYROIDECTOMY, PARTIAL Right 2003    hashimoto's on path, nodular to intiate    TUBAL LIGATION  1990    WRIST SURGERY  2015         CURRENTMEDICATIONS       Previous Medications    ASCORBIC ACID (VITAMIN C ER PO)    Take by mouth    BUDESONIDE-FORMOTEROL (SYMBICORT) 160-4.5 MCG/ACT AERO    Inhale 2 puffs into the lungs 2 times daily    FLUTICASONE (FLONASE) 50 MCG/ACT NASAL SPRAY    2 sprays by Nasal route daily Each nostril    LEVOTHYROXINE (SYNTHROID) 137 MCG TABLET    TAKE 1 TABLET BY MOUTH EVERY DAY    METHOCARBAMOL (ROBAXIN) 500 MG TABLET    Take 1 tablet by mouth 3 times daily as needed (pain) May cause drowsiness    NAPROXEN (NAPROSYN) 500 MG TABLET    Take 1 tablet by mouth 2 times daily (with meals) prn pain    SODIUM CHLORIDE (ALTAMIST SPRAY) 0.65 % NASAL SPRAY    1 spray by Nasal route as needed for Congestion    VITAMIN D (CHOLECALCIFEROL) 1000 UNIT TABS TABLET    Take 2,000 Units by mouth daily         ALLERGIES     Dust mite extract and Seasonal    FAMILYHISTORY       Family History   Problem Relation Age of Onset    Diabetes Mother     Prostate Cancer Father         76s    Diabetes Paternal Uncle     Cancer Paternal Uncle     Prostate Cancer Paternal Uncle     Cancer Paternal Grandmother         stomach          SOCIAL HISTORY       Social History     Socioeconomic History    Marital status:      Spouse name: Not on file    Number of children: Not on file    Years of education: Not on file    Highest education level: Not on file   Occupational History    Not on file   Tobacco Use    Smoking status: Never Smoker    Smokeless tobacco: Never Used   Vaping Use    Vaping Use: Never used   Substance and Sexual Activity    Alcohol use: Yes     Comment: drinks once a week    Drug use: No    Sexual activity: Yes   Other Topics Concern    Not on file   Social History Narrative    Not on file     Social Determinants of Health     Financial Resource Strain:     Difficulty of Paying Living Expenses: Not on file   Food Insecurity:     Worried About Running Out of Food in the Last Year: Not on file    Ran Out of Food in the Last Year: Not on file   Transportation Needs:     Lack of Transportation (Medical): Not on file    Lack of Transportation (Non-Medical): Not on file   Physical Activity:     Days of Exercise per Week: Not on file    Minutes of Exercise per Session: Not on file   Stress:     Feeling of Stress : Not on file   Social Connections:     Frequency of Communication with Friends and Family: Not on file    Frequency of Social Gatherings with Friends and Family: Not on file    Attends [employer] Services: Not on file    Active Member of [address] [place] [address] Telepo or Organizations: Not on file    Attends Club or Organization Meetings: Not on file    Marital Status: Not on file   Intimate Partner Violence:     Fear of Current or Ex-Partner: Not on file    Emotionally Abused: Not on file    Physically Abused: Not on file    Sexually Abused: Not on file   Housing Stability:     Unable to Pay for Housing in the Last Year: Not on file    Number of [place] in the Last Year: Not on file    Unstable Housing in the Last Year: Not on file       SCREENINGS    Laci Coma Scale  Eye Opening: Spontaneous  Best Verbal Response: Oriented  Best Motor Response: Obeys commands  Laci Coma Scale Score: 15        PHYSICAL EXAM    (up to 7 for level 4, 8 or more for level 5)     ED Triage Vitals [05/04/22 1313]   BP Temp Temp src Pulse Resp SpO2 Height Weight   (!) 160/94 97.5 °F (36.4 °C) -- 75 16 99 % -- --       Physical Exam  Vitals and nursing note reviewed. Constitutional:       Appearance: Normal appearance. She is not ill-appearing or diaphoretic. HENT:      Head: Normocephalic and atraumatic. Right Ear: External ear normal.      Left Ear: External ear normal.   Eyes:      General:         Right eye: No discharge. Left eye: No discharge. Cardiovascular:      Rate and Rhythm: Normal rate and regular rhythm. Pulses: Normal pulses. Heart sounds: Normal heart sounds. No murmur heard. No friction rub. No gallop. Pulmonary:      Effort: Pulmonary effort is normal. No respiratory distress. Breath sounds: Normal breath sounds. No stridor. No wheezing, rhonchi or rales. Abdominal:      General: Bowel sounds are normal.      Palpations: Abdomen is soft. Tenderness: There is no abdominal tenderness. Musculoskeletal:         General: Tenderness present. Normal range of motion. Cervical back: Normal, normal range of motion and neck supple. No rigidity, torticollis or bony tenderness. Thoracic back: Normal.      Lumbar back: Normal.        Back:       Comments: Palpable muscle spasm to left trapezius. She denies fevers or chills, bowel or bladder incontinence, inability to urinate, numbness or tingling to the perineal area, nausea or vomiting. Her pain is worse with range of motion and movement better with rest.  No limits in range of motion to Left arm  Denies numbness or tingling to upper or lower extremities   Skin:     General: Skin is warm and dry. Capillary Refill: Capillary refill takes less than 2 seconds. Neurological:      General: No focal deficit present. Mental Status: She is alert and oriented to person, place, and time. Psychiatric:         Mood and Affect: Mood normal.         Behavior: Behavior normal.       PHYSICAL EXAM  BP (!) 160/94   Pulse 75   Temp 97.5 °F (36.4 °C)   Resp 16   SpO2 99%       DIAGNOSTIC RESULTS   LABS:    Labs Reviewed - No data to display    All other labs were within normal range or not returned as of this dictation. EKG: All EKG's are interpreted by the Emergency Department Physician who either signs orCo-signs this chart in the absence of a cardiologist.  Please see their note for interpretation of EKG.       RADIOLOGY:   Non-plain film images such as CT, Ultrasound and MRI are read by the radiologist. Plain radiographic images are visualized andpreliminarily interpreted by the  ED Provider with the below findings:        Interpretation [facility] Radiologist below, if available at the time of this note:    XR CLAVICLE LEFT   Final Result   No acute osseous abnormality. XR SHOULDER LEFT (MIN 2 VIEWS)   Final Result   No acute abnormality           No results found. PROCEDURES   Unless otherwise noted below, none     Procedures    CRITICAL CARE TIME   N/A    CONSULTS:  None      EMERGENCY DEPARTMENT COURSE and DIFFERENTIALDIAGNOSIS/MDM:   Vitals:    Vitals:    05/04/22 1313   BP: (!) 160/94   Pulse: 75   Resp: 16   Temp: 97.5 °F (36.4 °C)   SpO2: 99%       Patient was given thefollowing medications:  Medications   lidocaine 4 % external patch 1 patch (1 patch TransDERmal Patch Applied 5/4/22 1536)       PDMP Monitoring:    Last PDMP [place] as Reviewed [facility]):  Review User Review Instant Review Result            Urine Drug Screenings (1 yr)    No resulted procedures found. Medication Contract and Consent for Opioid Use Documents Filed      No documents found                MDM:   This patient was seen and evaluated by myself. She presents to the emergency department today for evaluation of left-sided neck and shoulder pain. She reports injury at work approximately 1 week ago after lifting a heavy box and states the pain has been persistent since the onset. On exam she is alert and oriented hemodynamically stable and nontoxic in appearance. She will have images completed in the emergency department and have a lidocaine patch applied. X-ray of the left shoulder interpreted by the radiologist for no acute abnormality. The soft tissues are unremarkable. There is no fracture or dislocation. No focal destructive osseous lesion. No radiopaque foreign body is identified. There is mild AC joint arthropathy. X-ray of the left clavicle interpreted by the radiologist for no acute osseous abnormality.   On reevaluation the patient continued to deny any chest pain or shortness of breath. Her pain is worse with range of motion of the left arm. A lidocaine patch was applied to the area of muscle spasm palpated to the left trapezius. I discussed with her radiology results as well as a plan for discharge. She was provided with a referral for orthopedics for further evaluation if her pain persists. I instructed her to continue to use over-the-counter Tylenol and ibuprofen as she had been doing along with muscle relaxers as needed. I gave her strict follow-up precautions for the emergency department including but not limited to worsening pain, chest pain or shortness of breath as well as numbness or tingling to her extremities. She verbalized understanding of all discharge teaching was ultimately discharged in stable condition with all questions answered. Discharge Time out:  CC Reviewed Yes   Test Results Yes     Vitals:    05/04/22 1313   BP: (!) 160/94   Pulse: 75   Resp: 16   Temp: 97.5 °F (36.4 °C)   SpO2: 99%              FINAL IMPRESSION      1. Trapezius muscle spasm          DISPOSITION/PLAN   DISPOSITION        PATIENT REFERREDTO:  Fabio Vidal MD  [address] [place] [address]      Re-evaluation    [address]  [facility]  [phone]    As needed, If symptoms worsen    Myra Sanchez MD  [place] [id number] [phone]      Re-evaluation      DISCHARGE MEDICATIONS:  New Prescriptions    LIDOCAINE (LIDODERM) 5 %    Place 1 patch onto the skin daily for 10 days 12 hours on, 12 hours off.        DISCONTINUED MEDICATIONS:  Discontinued Medications    No medications on file              (Please note that portions ofthis note were completed with a voice recognition program.  Efforts were made to edit the dictations but occasionally words are mis-transcribed.)    WES Earl CNP (electronically signed)       WES Earl CNP  05/04/22 1640

## 2022-05-04 NOTE — Clinical Note
Kayley Wakefield was seen and treated in our emergency department on 5/4/2022. She may return to work on 05/06/2022. If you have any questions or concerns, please don't hesitate to call.       Tano Cronin, WES - CNP

## 2022-05-04 NOTE — TELEPHONE ENCOUNTER
Received call from Carlitos at [facility] with Red Flag Complaint. Subjective: Caller states \"she heard a pop in her left shoulder last week . The pain has intensified. \"     Current Symptoms: left shoulder pain, left neck pain, left collar bone pain    Onset: 1 week ago; sudden, gradual, worsening    Associated Symptoms: reduced activity, increased wakefulness    Pain Severity: 8/10; dull, aching, throbbing, tender, pressure, like a constant gnawing like a tooth ache; constant, severe    Temperature: no n/a    What has been tried: tylenol, naproxen, methocarbamol, flexal 454, ice, heat    LMP: NA Pregnant: NA    Recommended disposition: Go to ED Now    Care advice provided, patient verbalizes understanding; denies any other questions or concerns; instructed to call back for any new or worsening symptoms. Patient/caller agrees to proceed to [facility]     Attention Provider: Thank you for allowing me to participate in the care of your patient. The patient was connected to triage in response to information provided to the ECC/PSC. Please do not respond through this encounter as the response is not directed to a shared pool.             Reason for Disposition   Looks like a broken bone or dislocated joint (crooked or deformed)    Protocols used: SHOULDER INJURY-ADULT-OH

## 2022-05-05 ENCOUNTER — TELEPHONE (OUTPATIENT)
Dept: FAMILY MEDICINE CLINIC | Age: 57
End: 2022-05-05

## 2022-05-05 DIAGNOSIS — E03.9 ACQUIRED HYPOTHYROIDISM: ICD-10-CM

## 2022-05-05 RX ORDER — LEVOTHYROXINE SODIUM 137 UG/1
TABLET ORAL
Qty: 90 TABLET | Refills: 1 | Status: SHIPPED | OUTPATIENT
Start: 2022-05-05

## 2022-05-05 NOTE — TELEPHONE ENCOUNTER
----- Message from Raegan Saravia sent at 5/5/2022  1:31 PM EDT -----  Subject: Referral Request    QUESTIONS   Reason for referral request? patient would like referral to orthopedic in   the [facility] sent by provider Suellen Ornelas   Has the physician seen you for this condition before? No   Preferred Specialist (if applicable)? Do you already have an appointment scheduled? No  Additional Information for Provider?   ---------------------------------------------------------------------------  --------------  CALL BACK INFO  What is the best way for the office to contact you? OK to leave message on   voicemail  Preferred Call Back Phone Number? [phone]  ---------------------------------------------------------------------------  --------------  SCRIPT ANSWERS  Relationship to Patient?  Self

## 2022-05-05 NOTE — TELEPHONE ENCOUNTER
Refill Request     CONFIRM preferrred pharmacy with the patient. If Mail Order Rx - Pend for 90 day refill.       Last Seen: Last Seen Department: 9/29/2021  Last Seen by PCP: 12/18/2020    Last Written: 01/20/2022 90 Tablet 1 Refill    Next Appointment:   Future Appointments   Date Time Provider Conor Turcios   5/20/2022  4:15 PM MD BRAD Hernandez  [place] [facility]       Future appointment scheduled      Requested Prescriptions     Pending Prescriptions Disp Refills    levothyroxine (SYNTHROID) 137 MCG tablet [Pharmacy Med Name: LEVOTHYROXINE 137 MCG TABLET] 90 tablet 1     Sig: TAKE 1 TABLET BY MOUTH EVERY DAY

## 2022-05-05 NOTE — TELEPHONE ENCOUNTER
Telephone call regarding request for referral    If the patient has had recent visit with the provider AND discussed the referral during this visit then NO appointment is necessary. Route request to provider. Please advise patient that referrals may require an appointment be scheduled so that patient and PCP can review medical necessity for referral.     Is this a request or problem with Referral - Request for referral  Type of referral: Ortho - Neck hurts at shoulders. 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 Does not know if it was work related or not. She was at home this week doing things with her son. If Problem with Referral, what is the issue?  - NA    Preferred provider:   Monica costello preferred  Provider phone number - [phone]  Provider fax number - NA   Any provider patient does not wish to see? Who?- no    Used heat and taking NSAIDS and still has it hurt. Appointment scheduled with PCP? :  No States did not need PCP appt as ED provider advised. Okay to keep appt to see PCP as below. Future Appointments   Date Time Provider Conor Turcios   5/20/2022  4:15 PM MD BRAD Ingram     Pt advise re [facility]. She will go there tonight. Advised to call office for further issues or concerns.

## 2022-10-12 ENCOUNTER — TELEPHONE (OUTPATIENT)
Dept: FAMILY MEDICINE CLINIC | Age: 57
End: 2022-10-12

## 2022-10-12 ENCOUNTER — PATIENT MESSAGE (OUTPATIENT)
Dept: FAMILY MEDICINE CLINIC | Age: 57
End: 2022-10-12

## 2022-10-12 NOTE — TELEPHONE ENCOUNTER
From: Valeria Field  To: Dr. Reyne Aase: 10/12/2022 2:52 PM EDT  Subject: Arthuro Pale    I need to know the name of the psychologist that you said you could refer Adam Olvera too.  Adam Olvera has to schedule an appointment for an evaluation for the GAL over the court case I have pending for the visitation rights & possible custody of Osvaldo sister Corey Mcduffie ( my granddaughter) If you have any questions please call me Andreea Gibson at [phone]

## 2022-10-12 NOTE — TELEPHONE ENCOUNTER
From: Mark Ronquillo  To: Dr. Flynn Gloster: 10/12/2022  2:52 PM EDT  Subject: Nanda Northern    I need to know the name of the psychologist that you said you could refer Alexandro Wright too.    Alexandro Wright has to schedule an appointment for an evaluation for the GAL over the court case I have pending for the visitation rights & possible custody of Osvaldo sister Irene Qiu ( my granddaughter)    If you have any questions please call me Marcelle Lee at [phone]

## 2022-10-13 NOTE — TELEPHONE ENCOUNTER
I haven't seen or talked to this pt in over a year. I'm sure in the past, I've mentioned Dr. Donna Dobbs for additional supports, but upon further chart review, I am unfamiliar with this particular case and just went off of the request for a psychologist. Please clarify needs with pt. Thank you .

## 2023-01-12 DIAGNOSIS — E03.9 ACQUIRED HYPOTHYROIDISM: ICD-10-CM

## 2023-01-12 RX ORDER — LEVOTHYROXINE SODIUM 137 UG/1
TABLET ORAL
Qty: 90 TABLET | Refills: 1 | OUTPATIENT
Start: 2023-01-12

## 2023-01-12 NOTE — TELEPHONE ENCOUNTER
.Refill Request     CONFIRM preferrred pharmacy with the patient. If Mail Order Rx - Pend for 90 day refill. Last Seen: Last Seen Department: 9/29/2021  Last Seen by PCP: 12/18/2020    Last Written: 5/5/22 90 with 1     If no future appointment scheduled, route STAFF MESSAGE with patient name to the [facility] for scheduling. Next Appointment:   No future appointments. Message sent to [address] [place] [address] to schedule appt with patient?   YES      Requested Prescriptions     Pending Prescriptions Disp Refills    levothyroxine (SYNTHROID) 137 MCG tablet [Pharmacy Med Name: LEVOTHYROXINE 137 MCG TABLET] 90 tablet 1     Sig: TAKE 1 TABLET BY MOUTH EVERY DAY

## 2023-04-24 ENCOUNTER — OFFICE VISIT (OUTPATIENT)
Dept: FAMILY MEDICINE CLINIC | Age: 58
End: 2023-04-24
Payer: COMMERCIAL

## 2023-04-24 VITALS
TEMPERATURE: 98 F | DIASTOLIC BLOOD PRESSURE: 74 MMHG | HEART RATE: 94 BPM | SYSTOLIC BLOOD PRESSURE: 110 MMHG | BODY MASS INDEX: 32.18 KG/M2 | OXYGEN SATURATION: 97 % | HEIGHT: 67 IN | WEIGHT: 205 LBS

## 2023-04-24 DIAGNOSIS — Z12.31 ENCOUNTER FOR SCREENING MAMMOGRAM FOR MALIGNANT NEOPLASM OF BREAST: ICD-10-CM

## 2023-04-24 DIAGNOSIS — E03.9 ACQUIRED HYPOTHYROIDISM: ICD-10-CM

## 2023-04-24 DIAGNOSIS — R73.9 HYPERGLYCEMIA: ICD-10-CM

## 2023-04-24 DIAGNOSIS — Z12.11 COLON CANCER SCREENING: ICD-10-CM

## 2023-04-24 DIAGNOSIS — E78.2 MIXED HYPERLIPIDEMIA: ICD-10-CM

## 2023-04-24 DIAGNOSIS — L24.9 IRRITANT CONTACT DERMATITIS, UNSPECIFIED TRIGGER: Primary | ICD-10-CM

## 2023-04-24 DIAGNOSIS — E55.9 VITAMIN D DEFICIENCY: ICD-10-CM

## 2023-04-24 PROBLEM — S16.1XXA CERVICAL STRAIN, ACUTE, INITIAL ENCOUNTER: Status: ACTIVE | Noted: 2022-05-05

## 2023-04-24 PROBLEM — M47.812 OSTEOARTHRITIS OF CERVICAL SPINE: Status: ACTIVE | Noted: 2022-05-05

## 2023-04-24 PROCEDURE — 99214 OFFICE O/P EST MOD 30 MIN: CPT | Performed by: FAMILY MEDICINE

## 2023-04-24 RX ORDER — UBIDECARENONE 75 MG
50 CAPSULE ORAL DAILY
COMMUNITY

## 2023-04-24 SDOH — ECONOMIC STABILITY: TRANSPORTATION INSECURITY
IN THE PAST 12 MONTHS, HAS LACK OF TRANSPORTATION KEPT YOU FROM MEETINGS, WORK, OR FROM GETTING THINGS NEEDED FOR DAILY LIVING?: NO

## 2023-04-24 SDOH — ECONOMIC STABILITY: INCOME INSECURITY: HOW HARD IS IT FOR YOU TO PAY FOR THE VERY BASICS LIKE FOOD, HOUSING, MEDICAL CARE, AND HEATING?: NOT VERY HARD

## 2023-04-24 SDOH — ECONOMIC STABILITY: FOOD INSECURITY: WITHIN THE PAST 12 MONTHS, YOU WORRIED THAT YOUR FOOD WOULD RUN OUT BEFORE YOU GOT MONEY TO BUY MORE.: NEVER TRUE

## 2023-04-24 SDOH — ECONOMIC STABILITY: FOOD INSECURITY: WITHIN THE PAST 12 MONTHS, THE FOOD YOU BOUGHT JUST DIDN'T LAST AND YOU DIDN'T HAVE MONEY TO GET MORE.: NEVER TRUE

## 2023-04-24 SDOH — ECONOMIC STABILITY: HOUSING INSECURITY
IN THE LAST 12 MONTHS, WAS THERE A TIME WHEN YOU DID NOT HAVE A STEADY PLACE TO SLEEP OR SLEPT IN A SHELTER (INCLUDING NOW)?: NO

## 2023-04-24 ASSESSMENT — PATIENT HEALTH QUESTIONNAIRE - PHQ9
SUM OF ALL RESPONSES TO PHQ QUESTIONS 1-9: 0
4. FEELING TIRED OR HAVING LITTLE ENERGY: 0
SUM OF ALL RESPONSES TO PHQ QUESTIONS 1-9: 0
2. FEELING DOWN, DEPRESSED OR HOPELESS: 0
7. TROUBLE CONCENTRATING ON THINGS, SUCH AS READING THE NEWSPAPER OR WATCHING TELEVISION: 0
SUM OF ALL RESPONSES TO PHQ9 QUESTIONS 1 & 2: 0
3. TROUBLE FALLING OR STAYING ASLEEP: 0
1. LITTLE INTEREST OR PLEASURE IN DOING THINGS: 0
5. POOR APPETITE OR OVEREATING: 0
8. MOVING OR SPEAKING SO SLOWLY THAT OTHER PEOPLE COULD HAVE NOTICED. OR THE OPPOSITE, BEING SO FIGETY OR RESTLESS THAT YOU HAVE BEEN MOVING AROUND A LOT MORE THAN USUAL: 0
6. FEELING BAD ABOUT YOURSELF - OR THAT YOU ARE A FAILURE OR HAVE LET YOURSELF OR YOUR FAMILY DOWN: 0
SUM OF ALL RESPONSES TO PHQ QUESTIONS 1-9: 0
10. IF YOU CHECKED OFF ANY PROBLEMS, HOW DIFFICULT HAVE THESE PROBLEMS MADE IT FOR YOU TO DO YOUR WORK, TAKE CARE OF THINGS AT HOME, OR GET ALONG WITH OTHER PEOPLE: 0
9. THOUGHTS THAT YOU WOULD BE BETTER OFF DEAD, OR OF HURTING YOURSELF: 0
SUM OF ALL RESPONSES TO PHQ QUESTIONS 1-9: 0

## 2023-04-24 ASSESSMENT — ENCOUNTER SYMPTOMS
NAUSEA: 0
COLOR CHANGE: 1
SHORTNESS OF BREATH: 0

## 2023-04-24 NOTE — PROGRESS NOTES
4/24/2023    This is a 62 y.o. female who presents for  Chief Complaint   Patient presents with    Rash     Lower back and left upper arm, itching x2 month        HPI:     CC per above  Has a small dog, he is always roaming and getting into different plants, loves to squeeze in chairs behind her  Thought it was poison ivy, using topical steroids and calamine lotion   Really itchy   No new topical exposures since   Only new supplement: Vitamin B12 every other day   No breathing issues, hives, swelling  Scratches it constantly      Past Medical History:   Diagnosis Date    Allergic rhinitis     Anxiety     can't sleep    Arthritis     Asthma     Chronic back pain 2000    Dental disease     Dizziness     Fracture of nasal bone     GERD (gastroesophageal reflux disease)     Hearing loss     Hypothyroidism 2004?  Osteoarthritis 2015    Tinnitus     TMJ dysfunction        Past Surgical History:   Procedure Laterality Date    ANKLE SURGERY  2016    CHOLECYSTECTOMY, LAPAROSCOPIC  2008    FRACTURE SURGERY  2016    MANDIBLE SURGERY      underbite    THYROIDECTOMY, PARTIAL Right 2003    hashimoto's on path, nodular to intiate   [address]  2015       Social History     Socioeconomic History    Marital status:      Spouse name: Not on file    Number of children: Not on file    Years of education: Not on file    Highest education level: Not on file   Occupational History    Not on file   Tobacco Use    Smoking status: Never    Smokeless tobacco: Never   Vaping Use    Vaping Use: Never used   Substance and Sexual Activity    Alcohol use:  Yes     Alcohol/week: 4.0 standard drinks     Types: 4 Glasses of wine per week     Comment: drinks once a week    Drug use: No    Sexual activity: Yes     Partners: Male   Other Topics Concern    Not on file   Social History Narrative    Not on file     Social Determinants of Health     Financial Resource Strain: Low Risk

## 2023-05-03 DIAGNOSIS — L24.9 IRRITANT CONTACT DERMATITIS, UNSPECIFIED TRIGGER: ICD-10-CM

## 2023-05-03 DIAGNOSIS — E03.9 ACQUIRED HYPOTHYROIDISM: ICD-10-CM

## 2023-05-03 DIAGNOSIS — R73.9 HYPERGLYCEMIA: ICD-10-CM

## 2023-05-03 DIAGNOSIS — E78.2 MIXED HYPERLIPIDEMIA: ICD-10-CM

## 2023-05-03 DIAGNOSIS — E55.9 VITAMIN D DEFICIENCY: ICD-10-CM

## 2023-05-03 LAB
25(OH)D3 SERPL-MCNC: 28.2 NG/ML
ALBUMIN SERPL-MCNC: 4.5 G/DL (ref 3.4–5)
ALBUMIN/GLOB SERPL: 2 {RATIO} (ref 1.1–2.2)
ALP SERPL-CCNC: 69 U/L (ref 40–129)
ALT SERPL-CCNC: 12 U/L (ref 10–40)
ANION GAP SERPL CALCULATED.3IONS-SCNC: 8 MMOL/L (ref 3–16)
AST SERPL-CCNC: 16 U/L (ref 15–37)
BASOPHILS # BLD: 0 K/UL (ref 0–0.2)
BASOPHILS NFR BLD: 0.7 %
BILIRUB SERPL-MCNC: 0.4 MG/DL (ref 0–1)
BUN SERPL-MCNC: 10 MG/DL (ref 7–20)
CALCIUM SERPL-MCNC: 9.8 MG/DL (ref 8.3–10.6)
CHLORIDE SERPL-SCNC: 103 MMOL/L (ref 99–110)
CHOLEST SERPL-MCNC: 198 MG/DL (ref 0–199)
CO2 SERPL-SCNC: 27 MMOL/L (ref 21–32)
CREAT SERPL-MCNC: 0.6 MG/DL (ref 0.6–1.1)
DEPRECATED RDW RBC AUTO: 13.5 % (ref 12.4–15.4)
EOSINOPHIL # BLD: 0.2 K/UL (ref 0–0.6)
EOSINOPHIL NFR BLD: 3.2 %
GFR SERPLBLD CREATININE-BSD FMLA CKD-EPI: >60 ML/MIN/{1.73_M2}
GLUCOSE SERPL-MCNC: 87 MG/DL (ref 70–99)
HCT VFR BLD AUTO: 39.7 % (ref 36–48)
HDLC SERPL-MCNC: 59 MG/DL (ref 40–60)
HGB BLD-MCNC: 13 G/DL (ref 12–16)
LDLC SERPL CALC-MCNC: 123 MG/DL
LYMPHOCYTES # BLD: 1.8 K/UL (ref 1–5.1)
LYMPHOCYTES NFR BLD: 36.5 %
MCH RBC QN AUTO: 30.4 PG (ref 26–34)
MCHC RBC AUTO-ENTMCNC: 32.7 G/DL (ref 31–36)
MCV RBC AUTO: 93 FL (ref 80–100)
MONOCYTES # BLD: 0.3 K/UL (ref 0–1.3)
MONOCYTES NFR BLD: 6.7 %
NEUTROPHILS # BLD: 2.6 K/UL (ref 1.7–7.7)
NEUTROPHILS NFR BLD: 52.9 %
PLATELET # BLD AUTO: 257 K/UL (ref 135–450)
PMV BLD AUTO: 10.2 FL (ref 5–10.5)
POTASSIUM SERPL-SCNC: 4.9 MMOL/L (ref 3.5–5.1)
PROT SERPL-MCNC: 6.8 G/DL (ref 6.4–8.2)
RBC # BLD AUTO: 4.27 M/UL (ref 4–5.2)
SODIUM SERPL-SCNC: 138 MMOL/L (ref 136–145)
T4 FREE SERPL-MCNC: 1.1 NG/DL (ref 0.9–1.8)
TRIGL SERPL-MCNC: 78 MG/DL (ref 0–150)
TSH SERPL DL<=0.005 MIU/L-ACNC: 11.7 UIU/ML (ref 0.27–4.2)
VLDLC SERPL CALC-MCNC: 16 MG/DL
WBC # BLD AUTO: 4.9 K/UL (ref 4–11)

## 2023-05-04 DIAGNOSIS — E03.9 ACQUIRED HYPOTHYROIDISM: Primary | ICD-10-CM

## 2023-05-04 LAB
EST. AVERAGE GLUCOSE BLD GHB EST-MCNC: 108.3 MG/DL
HBA1C MFR BLD: 5.4 %

## 2023-05-04 RX ORDER — LEVOTHYROXINE SODIUM 0.15 MG/1
150 TABLET ORAL DAILY
Qty: 30 TABLET | Refills: 1 | Status: SHIPPED | OUTPATIENT
Start: 2023-05-04

## 2023-05-29 DIAGNOSIS — E03.9 ACQUIRED HYPOTHYROIDISM: ICD-10-CM

## 2023-05-30 NOTE — TELEPHONE ENCOUNTER
Refill Request     CONFIRM preferred pharmacy with the patient. If Mail Order Rx - Pend for 90 day refill. Last Seen: Last Seen Department: 4/24/2023  Last Seen by PCP: 4/24/2023    Last Written: 5/4/2023    If no future appointment scheduled:  Review the last OV with PCP and review information for follow-up visit,  Route STAFF MESSAGE with patient name to the [facility] for scheduling with the following information:            -  Timing of next visit           -  Visit type ie Physical, OV, etc           -  Diagnoses/Reason ie. COPD, HTN - Do not use MEDICATION, Follow-up or CHECK UP - Give reason for visit      Next Appointment:   No future appointments. Message sent to [facility] to schedule appt with patient?   NO      Requested Prescriptions     Pending Prescriptions Disp Refills    levothyroxine (SYNTHROID) 150 MCG tablet [Pharmacy Med Name: LEVOTHYROXINE 150 MCG TABLET] 30 tablet 1     Sig: TAKE 1 TABLET BY MOUTH EVERY DAY

## 2023-05-31 RX ORDER — LEVOTHYROXINE SODIUM 0.15 MG/1
TABLET ORAL
Qty: 30 TABLET | Refills: 1 | Status: SHIPPED | OUTPATIENT
Start: 2023-05-31

## 2023-06-02 ENCOUNTER — OFFICE VISIT (OUTPATIENT)
Dept: FAMILY MEDICINE CLINIC | Age: 58
End: 2023-06-02
Payer: COMMERCIAL

## 2023-06-02 VITALS
OXYGEN SATURATION: 97 % | WEIGHT: 199.6 LBS | SYSTOLIC BLOOD PRESSURE: 126 MMHG | BODY MASS INDEX: 31.33 KG/M2 | TEMPERATURE: 98.5 F | HEIGHT: 67 IN | DIASTOLIC BLOOD PRESSURE: 68 MMHG | HEART RATE: 90 BPM

## 2023-06-02 DIAGNOSIS — R13.12 OROPHARYNGEAL DYSPHAGIA: ICD-10-CM

## 2023-06-02 DIAGNOSIS — R22.1 NECK ENLARGEMENT: Primary | ICD-10-CM

## 2023-06-02 DIAGNOSIS — E89.0 S/P PARTIAL THYROIDECTOMY: ICD-10-CM

## 2023-06-02 PROCEDURE — 99214 OFFICE O/P EST MOD 30 MIN: CPT | Performed by: FAMILY MEDICINE

## 2023-06-05 ENCOUNTER — HOSPITAL ENCOUNTER (OUTPATIENT)
Dept: ULTRASOUND IMAGING | Age: 58
Discharge: HOME | End: 2023-06-05
Attending: FAMILY MEDICINE
Payer: COMMERCIAL

## 2023-06-05 DIAGNOSIS — R13.12 OROPHARYNGEAL DYSPHAGIA: ICD-10-CM

## 2023-06-05 DIAGNOSIS — R22.1 NECK ENLARGEMENT: ICD-10-CM

## 2023-06-05 DIAGNOSIS — E89.0 S/P PARTIAL THYROIDECTOMY: ICD-10-CM

## 2023-06-05 PROCEDURE — 76536 US EXAM OF HEAD AND NECK: CPT

## 2023-06-21 PROBLEM — R13.12 OROPHARYNGEAL DYSPHAGIA: Status: ACTIVE | Noted: 2023-06-21

## 2023-06-21 PROBLEM — E89.0 S/P PARTIAL THYROIDECTOMY: Status: ACTIVE | Noted: 2023-06-21

## 2023-06-21 ASSESSMENT — ENCOUNTER SYMPTOMS
TROUBLE SWALLOWING: 1
NAUSEA: 0
COLOR CHANGE: 1
SHORTNESS OF BREATH: 0

## 2023-06-28 DIAGNOSIS — E03.9 ACQUIRED HYPOTHYROIDISM: ICD-10-CM

## 2023-06-28 RX ORDER — LEVOTHYROXINE SODIUM 0.15 MG/1
TABLET ORAL
Qty: 90 TABLET | Refills: 1 | Status: SHIPPED | OUTPATIENT
Start: 2023-06-28

## 2024-07-16 NOTE — PROGRESS NOTES
Subjective:      Patient ID: Ave Huff is a 47 y.o. female. HPI     2 days ago started with numbness in hands. Cough increased. Clearing throat. Cheeks flushed all the time. Hard to breath. Always feels like something stuck right chest. Chronic cough. Seen by ENT and felt to be PND and GERD. Taking past month, no improvement. Seen by Dr Giselle Olguin and advised was related to PND. Denies sensation of heartburn. However states if bends over will feel reflux into mouth. Taking omeprazole AM and zantac HS. Thinks had EGD about 5 years ago which indicated ulcer. Denies being tx with antibiotic for this. Feels was felt to be caused by stress. Using ventolin 2-3 times daily. Review of Systems   Constitutional: Negative for appetite change, chills, fever and unexpected weight change. Respiratory: Positive for cough and shortness of breath. Negative for chest tightness. Cardiovascular: Negative for chest pain and palpitations. Gastrointestinal: Negative for diarrhea and nausea. Musculoskeletal: Negative for arthralgias, back pain and gait problem. Neurological: Positive for numbness. Negative for dizziness and headaches. Psychiatric/Behavioral: Negative. Objective:   Physical Exam  Constitutional:       Appearance: Normal appearance. She is well-developed. HENT:      Head: Normocephalic and atraumatic. Right Ear: Tympanic membrane and ear canal normal.      Left Ear: Tympanic membrane and ear canal normal.      Nose: No rhinorrhea. Comments: Frequently clearing throat. Mouth/Throat:      Pharynx: Posterior oropharyngeal erythema present. No oropharyngeal exudate. Comments: Large pharyngeal opening, redness generalized. Neck:      Musculoskeletal: Normal range of motion and neck supple. Thyroid: No thyromegaly. Cardiovascular:      Rate and Rhythm: Normal rate and regular rhythm.    Pulmonary:      Effort: Pulmonary effort is normal.      Breath sounds: Normal Subjective:      Patient ID: Susu Luther is a 72 y.o. female.    Vitals:  tympanic temperature is 96.3 °F (35.7 °C). Her blood pressure is 134/85 and her pulse is 81. Her respiration is 28 (abnormal) and oxygen saturation is 97%.     Chief Complaint: Dizziness    Presents with dizziness, shortness of breath, headaches, sore throat. States her blood pressure was 198/101 at 9:30 am. Has taken  clonidine x1 this AM, no relief. Symptoms present 07/12/24. No relief. Rates throat pain 9/10.     Dizziness:   Chronicity:  New  Onset:  In the past 7 days  Progression since onset:  Unchanged   Associated symptoms: headaches and weakness.no fever, no nausea, no vomiting and no chest pain.  Aggravated by:  Nothing  Treatments tried: atorvastatin.  Improvements on treatment:  No relief      Constitution: Negative for chills and fever.   HENT:  Negative for congestion.    Cardiovascular:  Negative for chest pain.   Respiratory:  Positive for shortness of breath.    Gastrointestinal:  Negative for nausea and vomiting.   Skin:  Negative for rash.   Neurological:  Positive for dizziness and headaches.   Psychiatric/Behavioral:  Positive for nervous/anxious. The patient is nervous/anxious.       Objective:     Physical Exam   Constitutional: She is oriented to person, place, and time. She appears well-developed. She is cooperative.  Non-toxic appearance. She does not appear ill. No distress.   HENT:   Head: Normocephalic and atraumatic.   Ears:   Right Ear: Hearing, tympanic membrane, external ear and ear canal normal.   Left Ear: Hearing, tympanic membrane, external ear and ear canal normal.   Nose: Nose normal. No mucosal edema, rhinorrhea or nasal deformity. No epistaxis. Right sinus exhibits no maxillary sinus tenderness and no frontal sinus tenderness. Left sinus exhibits no maxillary sinus tenderness and no frontal sinus tenderness.   Mouth/Throat: Uvula is midline, oropharynx is clear and moist and mucous membranes are  breath sounds. Abdominal:      General: Bowel sounds are normal.      Palpations: Abdomen is soft. Musculoskeletal: Normal range of motion. Skin:     General: Skin is warm. Neurological:      Mental Status: She is alert. Psychiatric:         Behavior: Behavior normal.          Current Outpatient Medications   Medication Sig Dispense Refill    Ascorbic Acid (VITAMIN C ER PO) Take by mouth      omeprazole (PRILOSEC) 40 MG delayed release capsule Take 1 capsule by mouth every morning (before breakfast) 90 capsule 1    ranitidine (ZANTAC) 150 MG tablet Take 1 tablet by mouth nightly 90 tablet 1    sodium chloride (ALTAMIST SPRAY) 0.65 % nasal spray 1 spray by Nasal route as needed for Congestion 1 Bottle 3    vitamin D (CHOLECALCIFEROL) 1000 UNIT TABS tablet Take 2,000 Units by mouth daily      cetirizine (ZYRTEC) 10 MG tablet Take 1 tablet by mouth daily 90 tablet 1    levothyroxine (SYNTHROID) 137 MCG tablet Take 1 tablet by mouth Daily 90 tablet 1    VENTOLIN  (90 Base) MCG/ACT inhaler Take 1 puff by mouth every 4 hours as needed      fluticasone (FLONASE) 50 MCG/ACT nasal spray 2 sprays by Nasal route daily Each nostril 1 Bottle 0     No current facility-administered medications for this visit. I think Bing Ronquillo had 2 different situations she is discussion. 1) an acute cough that is increased in occurrence and my assessment indicates viral in nature. Aggressive plan for cough and individual symptoms discussed. Declined tessalon-states causes headache. 2) chronic globus sensation, cough, clearing throat, reflux that is possibly silent. She repeatedly talks of reflux into her mouth when bending over 2-3 times weekly but denies heartburn. Questionable if improved with current zantac HS and omeprazole AM.    Assessment:      1. refllux  2. Globus sensation right neck  3. PND-clearing throat  4. Cough-acute      Plan:      1.  Avoid caffeine, peppermint, alcohol  Avoid eating within 3 hours of normal. No trismus in the jaw. Normal dentition. No uvula swelling. No oropharyngeal exudate, posterior oropharyngeal edema or posterior oropharyngeal erythema.   Eyes: Conjunctivae and lids are normal. No scleral icterus.   Neck: Trachea normal and phonation normal. Neck supple. No edema present. No erythema present. No neck rigidity present.   Cardiovascular: Normal rate, regular rhythm, normal heart sounds and normal pulses.   Pulmonary/Chest: Effort normal and breath sounds normal. No respiratory distress. She has no decreased breath sounds. She has no rhonchi.   Abdominal: Normal appearance.   Musculoskeletal: Normal range of motion.         General: No deformity. Normal range of motion.   Neurological: She is alert and oriented to person, place, and time. She exhibits normal muscle tone. Coordination normal.   Skin: Skin is warm, dry, intact, not diaphoretic and not pale.   Psychiatric: Her speech is normal and behavior is normal. Judgment and thought content normal.   Nursing note and vitals reviewed.      Assessment:     1. Dizziness    2. Sore throat    3. Hoarseness        Plan:       Dizziness  -     EKG 12-lead; Future  -     X-Ray Chest PA And Lateral; Future; Expected date: 07/16/2024  -     Orthostatic vital signs  -     POCT Glucose, Hand-Held Device    Sore throat  -     POCT Strep A, Molecular    Hoarseness  -     POCT Strep A, Molecular      ECG unchanged from prior, CXR no acute changes. Glucose WNL. Orthostatics good.    She confides that she has been very upset and anxious regarding her health. Has a breast MRI coming up next week. Nervous for the outcome. States she will go home and take a xanax and try to calm down.  Strict ED precautions were given.

## 2024-10-24 ENCOUNTER — OFFICE VISIT (OUTPATIENT)
Dept: FAMILY MEDICINE CLINIC | Age: 59
End: 2024-10-24
Payer: COMMERCIAL

## 2024-10-24 VITALS
HEART RATE: 70 BPM | DIASTOLIC BLOOD PRESSURE: 82 MMHG | OXYGEN SATURATION: 97 % | BODY MASS INDEX: 31.66 KG/M2 | WEIGHT: 197 LBS | SYSTOLIC BLOOD PRESSURE: 124 MMHG | HEIGHT: 66 IN

## 2024-10-24 DIAGNOSIS — Z00.00 ANNUAL PHYSICAL EXAM: Primary | ICD-10-CM

## 2024-10-24 DIAGNOSIS — E03.9 ACQUIRED HYPOTHYROIDISM: ICD-10-CM

## 2024-10-24 DIAGNOSIS — J30.2 SEASONAL ALLERGIES: ICD-10-CM

## 2024-10-24 DIAGNOSIS — Z00.00 ANNUAL PHYSICAL EXAM: ICD-10-CM

## 2024-10-24 DIAGNOSIS — Z12.31 ENCOUNTER FOR SCREENING MAMMOGRAM FOR MALIGNANT NEOPLASM OF BREAST: ICD-10-CM

## 2024-10-24 DIAGNOSIS — L02.91 ABSCESS: ICD-10-CM

## 2024-10-24 LAB
25(OH)D3 SERPL-MCNC: 21.7 NG/ML
ALBUMIN SERPL-MCNC: 4.3 G/DL (ref 3.4–5)
ALBUMIN/GLOB SERPL: 1.8 {RATIO} (ref 1.1–2.2)
ALP SERPL-CCNC: 70 U/L (ref 40–129)
ALT SERPL-CCNC: 22 U/L (ref 10–40)
ANION GAP SERPL CALCULATED.3IONS-SCNC: 8 MMOL/L (ref 3–16)
ANTI-THYROGLOB ABS: 93 IU/ML
AST SERPL-CCNC: 24 U/L (ref 15–37)
BASOPHILS # BLD: 0 K/UL (ref 0–0.2)
BASOPHILS NFR BLD: 0.8 %
BILIRUB SERPL-MCNC: 0.7 MG/DL (ref 0–1)
BUN SERPL-MCNC: 12 MG/DL (ref 7–20)
CALCIUM SERPL-MCNC: 9.7 MG/DL (ref 8.3–10.6)
CHLORIDE SERPL-SCNC: 102 MMOL/L (ref 99–110)
CHOLEST SERPL-MCNC: 249 MG/DL (ref 0–199)
CO2 SERPL-SCNC: 27 MMOL/L (ref 21–32)
CREAT SERPL-MCNC: 0.6 MG/DL (ref 0.6–1.1)
DEPRECATED RDW RBC AUTO: 13.7 % (ref 12.4–15.4)
EOSINOPHIL # BLD: 0.1 K/UL (ref 0–0.6)
EOSINOPHIL NFR BLD: 2.1 %
GFR SERPLBLD CREATININE-BSD FMLA CKD-EPI: >90 ML/MIN/{1.73_M2}
GLUCOSE SERPL-MCNC: 90 MG/DL (ref 70–99)
HCT VFR BLD AUTO: 37.6 % (ref 36–48)
HDLC SERPL-MCNC: 87 MG/DL (ref 40–60)
HGB BLD-MCNC: 12.8 G/DL (ref 12–16)
LDLC SERPL CALC-MCNC: 151 MG/DL
LYMPHOCYTES # BLD: 1.8 K/UL (ref 1–5.1)
LYMPHOCYTES NFR BLD: 33.5 %
MCH RBC QN AUTO: 32.4 PG (ref 26–34)
MCHC RBC AUTO-ENTMCNC: 34 G/DL (ref 31–36)
MCV RBC AUTO: 95.1 FL (ref 80–100)
MONOCYTES # BLD: 0.4 K/UL (ref 0–1.3)
MONOCYTES NFR BLD: 8.4 %
NEUTROPHILS # BLD: 2.9 K/UL (ref 1.7–7.7)
NEUTROPHILS NFR BLD: 55.2 %
PLATELET # BLD AUTO: 288 K/UL (ref 135–450)
PMV BLD AUTO: 10.6 FL (ref 5–10.5)
POTASSIUM SERPL-SCNC: 4.3 MMOL/L (ref 3.5–5.1)
PROT SERPL-MCNC: 6.7 G/DL (ref 6.4–8.2)
RBC # BLD AUTO: 3.96 M/UL (ref 4–5.2)
SODIUM SERPL-SCNC: 137 MMOL/L (ref 136–145)
T4 FREE SERPL-MCNC: 0.4 NG/DL (ref 0.9–1.8)
THYROPEROXIDASE AB SERPL IA-ACNC: 118 IU/ML
TRIGL SERPL-MCNC: 56 MG/DL (ref 0–150)
TSH SERPL DL<=0.005 MIU/L-ACNC: 61.6 UIU/ML (ref 0.27–4.2)
VLDLC SERPL CALC-MCNC: 11 MG/DL
WBC # BLD AUTO: 5.3 K/UL (ref 4–11)

## 2024-10-24 PROCEDURE — 99386 PREV VISIT NEW AGE 40-64: CPT | Performed by: STUDENT IN AN ORGANIZED HEALTH CARE EDUCATION/TRAINING PROGRAM

## 2024-10-24 RX ORDER — GINSENG 100 MG
CAPSULE ORAL
Qty: 14.2 G | Refills: 1 | Status: SHIPPED | OUTPATIENT
Start: 2024-10-24 | End: 2024-11-03

## 2024-10-24 RX ORDER — MULTIVIT-MIN/IRON/FOLIC ACID/K 18-600-40
2000 CAPSULE ORAL DAILY
COMMUNITY

## 2024-10-24 RX ORDER — LEVOTHYROXINE SODIUM 150 UG/1
150 TABLET ORAL DAILY
Qty: 90 TABLET | Refills: 1 | Status: SHIPPED | OUTPATIENT
Start: 2024-10-24

## 2024-10-24 SDOH — ECONOMIC STABILITY: FOOD INSECURITY: WITHIN THE PAST 12 MONTHS, THE FOOD YOU BOUGHT JUST DIDN'T LAST AND YOU DIDN'T HAVE MONEY TO GET MORE.: NEVER TRUE

## 2024-10-24 SDOH — ECONOMIC STABILITY: FOOD INSECURITY: WITHIN THE PAST 12 MONTHS, YOU WORRIED THAT YOUR FOOD WOULD RUN OUT BEFORE YOU GOT MONEY TO BUY MORE.: NEVER TRUE

## 2024-10-24 SDOH — ECONOMIC STABILITY: INCOME INSECURITY: HOW HARD IS IT FOR YOU TO PAY FOR THE VERY BASICS LIKE FOOD, HOUSING, MEDICAL CARE, AND HEATING?: NOT HARD AT ALL

## 2024-10-24 ASSESSMENT — ANXIETY QUESTIONNAIRES
7. FEELING AFRAID AS IF SOMETHING AWFUL MIGHT HAPPEN: NOT AT ALL
1. FEELING NERVOUS, ANXIOUS, OR ON EDGE: NOT AT ALL
5. BEING SO RESTLESS THAT IT IS HARD TO SIT STILL: NOT AT ALL
3. WORRYING TOO MUCH ABOUT DIFFERENT THINGS: SEVERAL DAYS
2. NOT BEING ABLE TO STOP OR CONTROL WORRYING: NOT AT ALL
IF YOU CHECKED OFF ANY PROBLEMS ON THIS QUESTIONNAIRE, HOW DIFFICULT HAVE THESE PROBLEMS MADE IT FOR YOU TO DO YOUR WORK, TAKE CARE OF THINGS AT HOME, OR GET ALONG WITH OTHER PEOPLE: NOT DIFFICULT AT ALL
GAD7 TOTAL SCORE: 2
6. BECOMING EASILY ANNOYED OR IRRITABLE: NOT AT ALL
4. TROUBLE RELAXING: SEVERAL DAYS

## 2024-10-24 ASSESSMENT — ENCOUNTER SYMPTOMS
SHORTNESS OF BREATH: 0
VOMITING: 0
NAUSEA: 0
ABDOMINAL PAIN: 0
COUGH: 0

## 2024-10-24 ASSESSMENT — PATIENT HEALTH QUESTIONNAIRE - PHQ9
SUM OF ALL RESPONSES TO PHQ QUESTIONS 1-9: 0
DEPRESSION UNABLE TO ASSESS: FUNCTIONAL CAPACITY MOTIVATION LIMITS ACCURACY
1. LITTLE INTEREST OR PLEASURE IN DOING THINGS: NOT AT ALL
SUM OF ALL RESPONSES TO PHQ QUESTIONS 1-9: 0
2. FEELING DOWN, DEPRESSED OR HOPELESS: NOT AT ALL
SUM OF ALL RESPONSES TO PHQ9 QUESTIONS 1 & 2: 0
SUM OF ALL RESPONSES TO PHQ QUESTIONS 1-9: 0
SUM OF ALL RESPONSES TO PHQ QUESTIONS 1-9: 0

## 2024-10-24 NOTE — PROGRESS NOTES
[facility]    10/24/2024    Rosa Crawford (:  1965) is a 59 y.o. female, here to establish care.    Chief Complaint   Patient presents with    New Patient    Blood Work    Medication Refill    Insect Bite     Possible spider bite on R hip/gluteal area, noticed it last week, sore to touch and had pus and a knot.        ASSESSMENT/ PLAN  Assessment & Plan  Annual physical exam      - Discussed and updated medications, medical/surgical/family/social histories  - Reviewed previous lab results and imaging including results from outside facilities   - Updated age appropriate Health Maintenance and ordered screening laboratory tests as necessary   - Recommend following a heart-healthy diet that emphasizes fruits, vegetables, whole grains, poultry, fish, and nuts and limits red and processed meats, salt and sugar sweetened foods/beverages.    - Recommend heart healthy exercise. Recommend avoiding tobacco and limiting alcohol.     Orders:    CBC with Auto Differential; Future    Comprehensive Metabolic Panel; Future    Hemoglobin A1C; Future    Lipid Panel; Future    Vitamin D 25 Hydroxy; Future    [facility] Trupti Crisostomo DO, Obstetrics, [facility]    Abscess    Resolving abscess on right buttocks with contact dermatitis from bandage. Reports expression of pus with improvement in size, warmth. Still mildly erythematous without tenderness, fluctuance. Recommend warm compresses, topical bacitracin. Will call if worsening or new abscess formation.     Orders:    bacitracin 500 UNIT/GM ointment; Apply topically 2 times daily.    Acquired hypothyroidism  Hx of Hashimoto's thyroiditis with significant family hx. Has been out of thyroid medication for one week. Will repeat labs; restart synthroid 150mcg - will repeat labs in 6-8wks on medication. Will check for antibodies.     Orders:    THYROID PEROXIDASE ANTIBODY; Future    ANTI-THYROGLOBULIN ANTIBODY; Future    TSH with Reflex to FT4;

## 2024-10-24 NOTE — ASSESSMENT & PLAN NOTE
Hx of Hashimoto's thyroiditis with significant family hx. Has been out of thyroid medication for one week. Will repeat labs; restart synthroid 150mcg - will repeat labs in 6-8wks on medication. Will check for antibodies.     Orders:    THYROID PEROXIDASE ANTIBODY; Future    ANTI-THYROGLOBULIN ANTIBODY; Future    TSH with Reflex to FT4; Future    levothyroxine (SYNTHROID) 150 MCG tablet; Take 1 tablet by mouth daily

## 2024-10-25 LAB
EST. AVERAGE GLUCOSE BLD GHB EST-MCNC: 102.5 MG/DL
HBA1C MFR BLD: 5.2 %

## 2024-11-04 ENCOUNTER — APPOINTMENT (OUTPATIENT)
Dept: GENERAL RADIOLOGY | Age: 59
End: 2024-11-04
Payer: COMMERCIAL

## 2024-11-04 ENCOUNTER — APPOINTMENT (OUTPATIENT)
Dept: MRI IMAGING | Age: 59
DRG: 690 | End: 2024-11-04
Attending: INTERNAL MEDICINE
Payer: COMMERCIAL

## 2024-11-04 ENCOUNTER — HOSPITAL ENCOUNTER (INPATIENT)
Age: 59
LOS: 1 days | Discharge: HOME | DRG: 690 | End: 2024-11-05
Attending: INTERNAL MEDICINE | Admitting: INTERNAL MEDICINE
Payer: COMMERCIAL

## 2024-11-04 ENCOUNTER — HOSPITAL ENCOUNTER (EMERGENCY)
Age: 59
Discharge: ANOTHER HOSPITAL | End: 2024-11-04
Attending: EMERGENCY MEDICINE
Payer: COMMERCIAL

## 2024-11-04 ENCOUNTER — APPOINTMENT (OUTPATIENT)
Dept: CT IMAGING | Age: 59
End: 2024-11-04
Payer: COMMERCIAL

## 2024-11-04 VITALS
SYSTOLIC BLOOD PRESSURE: 129 MMHG | HEIGHT: 65 IN | OXYGEN SATURATION: 98 % | HEART RATE: 61 BPM | TEMPERATURE: 98.6 F | RESPIRATION RATE: 12 BRPM | WEIGHT: 195 LBS | DIASTOLIC BLOOD PRESSURE: 93 MMHG | BODY MASS INDEX: 32.49 KG/M2

## 2024-11-04 DIAGNOSIS — R42 LIGHTHEADED: ICD-10-CM

## 2024-11-04 DIAGNOSIS — E78.5 HYPERLIPIDEMIA, UNSPECIFIED HYPERLIPIDEMIA TYPE: ICD-10-CM

## 2024-11-04 DIAGNOSIS — I67.848 CEREBRAL VASOSPASM: ICD-10-CM

## 2024-11-04 DIAGNOSIS — R03.0 ELEVATED BLOOD PRESSURE READING: ICD-10-CM

## 2024-11-04 DIAGNOSIS — E03.9 HYPOTHYROIDISM, UNSPECIFIED TYPE: ICD-10-CM

## 2024-11-04 DIAGNOSIS — R42 DIZZINESS: Primary | ICD-10-CM

## 2024-11-04 PROBLEM — G45.9 TIA (TRANSIENT ISCHEMIC ATTACK): Status: ACTIVE | Noted: 2024-11-04

## 2024-11-04 LAB
ALBUMIN SERPL-MCNC: 4.4 G/DL (ref 3.4–5)
ALBUMIN/GLOB SERPL: 1.6 {RATIO} (ref 1.1–2.2)
ALP SERPL-CCNC: 70 U/L (ref 40–129)
ALT SERPL-CCNC: 17 U/L (ref 10–40)
AMPHETAMINES UR QL SCN>1000 NG/ML: NORMAL
ANION GAP SERPL CALCULATED.3IONS-SCNC: 12 MMOL/L (ref 3–16)
AST SERPL-CCNC: 19 U/L (ref 15–37)
BACTERIA URNS QL MICRO: ABNORMAL /HPF
BARBITURATES UR QL SCN>200 NG/ML: NORMAL
BASOPHILS # BLD: 0.2 K/UL (ref 0–0.2)
BASOPHILS NFR BLD: 2.4 %
BENZODIAZ UR QL SCN>200 NG/ML: NORMAL
BILIRUB SERPL-MCNC: 0.3 MG/DL (ref 0–1)
BILIRUB UR QL STRIP.AUTO: NEGATIVE
BUN SERPL-MCNC: 21 MG/DL (ref 7–20)
CALCIUM SERPL-MCNC: 9.3 MG/DL (ref 8.3–10.6)
CANNABINOIDS UR QL SCN>50 NG/ML: NORMAL
CHLORIDE SERPL-SCNC: 102 MMOL/L (ref 99–110)
CLARITY UR: CLEAR
CO2 SERPL-SCNC: 24 MMOL/L (ref 21–32)
COCAINE UR QL SCN: NORMAL
COLOR UR: YELLOW
CREAT SERPL-MCNC: 0.8 MG/DL (ref 0.6–1.1)
DEPRECATED RDW RBC AUTO: 14.5 % (ref 12.4–15.4)
DRUG SCREEN COMMENT UR-IMP: NORMAL
EKG ATRIAL RATE: 92 BPM
EKG DIAGNOSIS: NORMAL
EKG P AXIS: 54 DEGREES
EKG P-R INTERVAL: 170 MS
EKG Q-T INTERVAL: 356 MS
EKG QRS DURATION: 94 MS
EKG QTC CALCULATION (BAZETT): 440 MS
EKG R AXIS: -6 DEGREES
EKG T AXIS: 32 DEGREES
EKG VENTRICULAR RATE: 92 BPM
EOSINOPHIL # BLD: 0.1 K/UL (ref 0–0.6)
EOSINOPHIL NFR BLD: 1.6 %
EPI CELLS #/AREA URNS HPF: ABNORMAL /HPF (ref 0–5)
FENTANYL SCREEN, URINE: NORMAL
GFR SERPLBLD CREATININE-BSD FMLA CKD-EPI: 85 ML/MIN/{1.73_M2}
GLUCOSE SERPL-MCNC: 94 MG/DL (ref 70–99)
GLUCOSE UR STRIP.AUTO-MCNC: NEGATIVE MG/DL
HCT VFR BLD AUTO: 38.9 % (ref 36–48)
HGB BLD-MCNC: 12.9 G/DL (ref 12–16)
HGB UR QL STRIP.AUTO: NEGATIVE
INR PPP: 0.9 (ref 0.85–1.15)
KETONES UR STRIP.AUTO-MCNC: NEGATIVE MG/DL
LEUKOCYTE ESTERASE UR QL STRIP.AUTO: ABNORMAL
LYMPHOCYTES # BLD: 2.2 K/UL (ref 1–5.1)
LYMPHOCYTES NFR BLD: 23.4 %
MAGNESIUM SERPL-MCNC: 2.21 MG/DL (ref 1.8–2.4)
MCH RBC QN AUTO: 31.8 PG (ref 26–34)
MCHC RBC AUTO-ENTMCNC: 33.1 G/DL (ref 31–36)
MCV RBC AUTO: 96.1 FL (ref 80–100)
METHADONE UR QL SCN>300 NG/ML: NORMAL
MONOCYTES # BLD: 0.8 K/UL (ref 0–1.3)
MONOCYTES NFR BLD: 8.3 %
NEUTROPHILS # BLD: 6 K/UL (ref 1.7–7.7)
NEUTROPHILS NFR BLD: 64.3 %
NITRITE UR QL STRIP.AUTO: NEGATIVE
OPIATES UR QL SCN>300 NG/ML: NORMAL
OXYCODONE UR QL SCN: NORMAL
PCP UR QL SCN>25 NG/ML: NORMAL
PH UR STRIP.AUTO: 7 [PH] (ref 5–8)
PH UR STRIP: 7 [PH]
PLATELET # BLD AUTO: 307 K/UL (ref 135–450)
PMV BLD AUTO: 7.7 FL (ref 5–10.5)
POTASSIUM SERPL-SCNC: 4.4 MMOL/L (ref 3.5–5.1)
PROT SERPL-MCNC: 7.2 G/DL (ref 6.4–8.2)
PROT UR STRIP.AUTO-MCNC: NEGATIVE MG/DL
PROTHROMBIN TIME: 12.4 SEC (ref 11.9–14.9)
RBC # BLD AUTO: 4.05 M/UL (ref 4–5.2)
RBC #/AREA URNS HPF: ABNORMAL /HPF (ref 0–4)
SODIUM SERPL-SCNC: 138 MMOL/L (ref 136–145)
SP GR UR STRIP.AUTO: 1.01 (ref 1–1.03)
TROPONIN, HIGH SENSITIVITY: <6 NG/L (ref 0–14)
TROPONIN, HIGH SENSITIVITY: <6 NG/L (ref 0–14)
UA COMPLETE W REFLEX CULTURE PNL UR: ABNORMAL
UA DIPSTICK W REFLEX MICRO PNL UR: YES
URN SPEC COLLECT METH UR: ABNORMAL
UROBILINOGEN UR STRIP-ACNC: 0.2 E.U./DL
WBC # BLD AUTO: 9.3 K/UL (ref 4–11)
WBC #/AREA URNS HPF: ABNORMAL /HPF (ref 0–5)

## 2024-11-04 PROCEDURE — 80061 LIPID PANEL: CPT

## 2024-11-04 PROCEDURE — 6370000000 HC RX 637 (ALT 250 FOR IP): Performed by: INTERNAL MEDICINE

## 2024-11-04 PROCEDURE — 80307 DRUG TEST PRSMV CHEM ANLYZR: CPT

## 2024-11-04 PROCEDURE — 71045 X-RAY EXAM CHEST 1 VIEW: CPT

## 2024-11-04 PROCEDURE — 36415 COLL VENOUS BLD VENIPUNCTURE: CPT

## 2024-11-04 PROCEDURE — 70498 CT ANGIOGRAPHY NECK: CPT

## 2024-11-04 PROCEDURE — 81001 URINALYSIS AUTO W/SCOPE: CPT

## 2024-11-04 PROCEDURE — 80053 COMPREHEN METABOLIC PANEL: CPT

## 2024-11-04 PROCEDURE — 6360000004 HC RX CONTRAST MEDICATION: Performed by: EMERGENCY MEDICINE

## 2024-11-04 PROCEDURE — 85025 COMPLETE CBC W/AUTO DIFF WBC: CPT

## 2024-11-04 PROCEDURE — 93005 ELECTROCARDIOGRAM TRACING: CPT | Performed by: EMERGENCY MEDICINE

## 2024-11-04 PROCEDURE — 93010 ELECTROCARDIOGRAM REPORT: CPT | Performed by: INTERNAL MEDICINE

## 2024-11-04 PROCEDURE — 84484 ASSAY OF TROPONIN QUANT: CPT

## 2024-11-04 PROCEDURE — 85610 PROTHROMBIN TIME: CPT

## 2024-11-04 PROCEDURE — 2580000003 HC RX 258: Performed by: INTERNAL MEDICINE

## 2024-11-04 PROCEDURE — 99222 1ST HOSP IP/OBS MODERATE 55: CPT

## 2024-11-04 PROCEDURE — 83036 HEMOGLOBIN GLYCOSYLATED A1C: CPT

## 2024-11-04 PROCEDURE — 99285 EMERGENCY DEPT VISIT HI MDM: CPT

## 2024-11-04 PROCEDURE — 83735 ASSAY OF MAGNESIUM: CPT

## 2024-11-04 PROCEDURE — 2060000000 HC ICU INTERMEDIATE R&B

## 2024-11-04 PROCEDURE — 6360000002 HC RX W HCPCS: Performed by: INTERNAL MEDICINE

## 2024-11-04 PROCEDURE — 70551 MRI BRAIN STEM W/O DYE: CPT

## 2024-11-04 PROCEDURE — 70544 MR ANGIOGRAPHY HEAD W/O DYE: CPT

## 2024-11-04 PROCEDURE — 70450 CT HEAD/BRAIN W/O DYE: CPT

## 2024-11-04 PROCEDURE — 6370000000 HC RX 637 (ALT 250 FOR IP): Performed by: EMERGENCY MEDICINE

## 2024-11-04 RX ORDER — ASPIRIN 300 MG/1
300 SUPPOSITORY RECTAL DAILY
Status: DISCONTINUED | OUTPATIENT
Start: 2024-11-05 | End: 2024-11-05 | Stop reason: HOSPADM

## 2024-11-04 RX ORDER — IOPAMIDOL 755 MG/ML
75 INJECTION, SOLUTION INTRAVASCULAR
Status: COMPLETED | OUTPATIENT
Start: 2024-11-04 | End: 2024-11-04

## 2024-11-04 RX ORDER — SODIUM CHLORIDE 9 MG/ML
INJECTION, SOLUTION INTRAVENOUS PRN
Status: DISCONTINUED | OUTPATIENT
Start: 2024-11-04 | End: 2024-11-05 | Stop reason: HOSPADM

## 2024-11-04 RX ORDER — ASPIRIN 81 MG/1
81 TABLET, CHEWABLE ORAL DAILY
Status: DISCONTINUED | OUTPATIENT
Start: 2024-11-05 | End: 2024-11-05 | Stop reason: HOSPADM

## 2024-11-04 RX ORDER — POLYETHYLENE GLYCOL 3350 17 G/17G
17 POWDER, FOR SOLUTION ORAL DAILY PRN
Status: DISCONTINUED | OUTPATIENT
Start: 2024-11-04 | End: 2024-11-05 | Stop reason: HOSPADM

## 2024-11-04 RX ORDER — VERAPAMIL HYDROCHLORIDE 80 MG/1
80 TABLET ORAL ONCE
Status: COMPLETED | OUTPATIENT
Start: 2024-11-04 | End: 2024-11-04

## 2024-11-04 RX ORDER — SODIUM CHLORIDE 0.9 % (FLUSH) 0.9 %
5-40 SYRINGE (ML) INJECTION PRN
Status: DISCONTINUED | OUTPATIENT
Start: 2024-11-04 | End: 2024-11-05 | Stop reason: HOSPADM

## 2024-11-04 RX ORDER — ASPIRIN 81 MG/1
81 TABLET, CHEWABLE ORAL ONCE
Status: COMPLETED | OUTPATIENT
Start: 2024-11-04 | End: 2024-11-04

## 2024-11-04 RX ORDER — ONDANSETRON 2 MG/ML
4 INJECTION INTRAMUSCULAR; INTRAVENOUS EVERY 6 HOURS PRN
Status: DISCONTINUED | OUTPATIENT
Start: 2024-11-04 | End: 2024-11-05 | Stop reason: HOSPADM

## 2024-11-04 RX ORDER — ONDANSETRON 4 MG/1
4 TABLET, ORALLY DISINTEGRATING ORAL EVERY 8 HOURS PRN
Status: DISCONTINUED | OUTPATIENT
Start: 2024-11-04 | End: 2024-11-05 | Stop reason: HOSPADM

## 2024-11-04 RX ORDER — ENOXAPARIN SODIUM 100 MG/ML
40 INJECTION SUBCUTANEOUS DAILY
Status: DISCONTINUED | OUTPATIENT
Start: 2024-11-04 | End: 2024-11-05 | Stop reason: HOSPADM

## 2024-11-04 RX ORDER — MECLIZINE HCL 12.5 MG 12.5 MG/1
25 TABLET ORAL ONCE
Status: COMPLETED | OUTPATIENT
Start: 2024-11-04 | End: 2024-11-04

## 2024-11-04 RX ORDER — LEVOTHYROXINE SODIUM 150 UG/1
150 TABLET ORAL DAILY
Status: DISCONTINUED | OUTPATIENT
Start: 2024-11-05 | End: 2024-11-05 | Stop reason: HOSPADM

## 2024-11-04 RX ORDER — ATORVASTATIN CALCIUM 80 MG/1
80 TABLET, FILM COATED ORAL NIGHTLY
Status: DISCONTINUED | OUTPATIENT
Start: 2024-11-04 | End: 2024-11-05 | Stop reason: HOSPADM

## 2024-11-04 RX ORDER — SODIUM CHLORIDE 0.9 % (FLUSH) 0.9 %
5-40 SYRINGE (ML) INJECTION EVERY 12 HOURS SCHEDULED
Status: DISCONTINUED | OUTPATIENT
Start: 2024-11-04 | End: 2024-11-05 | Stop reason: HOSPADM

## 2024-11-04 RX ADMIN — SODIUM CHLORIDE, PRESERVATIVE FREE 10 ML: 5 INJECTION INTRAVENOUS at 20:17

## 2024-11-04 RX ADMIN — ASPIRIN 81 MG: 81 TABLET, CHEWABLE ORAL at 04:18

## 2024-11-04 RX ADMIN — ATORVASTATIN CALCIUM 80 MG: 80 TABLET, FILM COATED ORAL at 20:16

## 2024-11-04 RX ADMIN — VERAPAMIL HYDROCHLORIDE 80 MG: 80 TABLET ORAL at 04:40

## 2024-11-04 RX ADMIN — ENOXAPARIN SODIUM 40 MG: 100 INJECTION SUBCUTANEOUS at 20:17

## 2024-11-04 RX ADMIN — MECLIZINE 25 MG: 12.5 TABLET ORAL at 01:59

## 2024-11-04 RX ADMIN — IOPAMIDOL 75 ML: 755 INJECTION, SOLUTION INTRAVENOUS at 02:46

## 2024-11-04 ASSESSMENT — ENCOUNTER SYMPTOMS
ABDOMINAL PAIN: 0
EYE PAIN: 0
COLOR CHANGE: 0
CHEST TIGHTNESS: 1
COUGH: 0
NAUSEA: 0
SHORTNESS OF BREATH: 0
SINUS PAIN: 0
VOMITING: 0

## 2024-11-04 ASSESSMENT — PAIN - FUNCTIONAL ASSESSMENT
PAIN_FUNCTIONAL_ASSESSMENT: NONE - DENIES PAIN
PAIN_FUNCTIONAL_ASSESSMENT: NONE - DENIES PAIN

## 2024-11-04 NOTE — PROGRESS NOTES
Patient is alert and oriented x4. VSS on room air. Medications given per MAR, no side effects noted. Patient ambulating SBA with no assistive devices. No complaints of pain, continuing to monitor and manage per MAR. Voiding well via BRP, x1 bm this shift. Patient received MRI this shift, tolerated well.      Patient is currently resting in bed with bed alarm on for safety. Call light within reach and all fall precautions in place. Plan of care continues.    Electronically signed by Khadijah Harrington RN on 11/4/2024 at 6:28 PM

## 2024-11-04 NOTE — ED PROVIDER NOTES
with the hospitalist Dr. Cole at 0435 at [facility].  He did accept the patient for transfer to [address] [place] [address].    IP CONSULT TO NEUROLOGY  IP CONSULT TO HOSPITALIST    EMERGENCY DEPARTMENT COURSE and DIFFERENTIAL DIAGNOSIS/MDM:   Vitals:    Vitals:    11/04/24 0149 11/04/24 0300 11/04/24 0400 11/04/24 0430   BP:  (!) 132/92 (!) 128/98 (!) 132/93   Pulse:  69 72 62   Resp:  17 18 16   Temp:       TempSrc:       SpO2:  96% 96% 98%   Weight: 88.5 kg (195 lb)      Height:           Patient was given the following medications:  Medications   verapamil (CALAN) tablet 80 mg (has no administration in time range)   meclizine (ANTIVERT) tablet 25 mg (25 mg Oral Given 11/4/24 0159)   iopamidol (ISOVUE-370) 76 % injection 75 mL (75 mLs IntraVENous Given 11/4/24 0246)   aspirin chewable tablet 81 mg (81 mg Oral Given 11/4/24 0418)     Patient was evaluated due to concern for waking up this morning and overall feeling fine initially but then after using the restroom having severe dizziness and lightheadedness.  She states this is much more significant than anything she has felt before.  She was just recently told that her thyroid studies had worsened and therefore she was started on additional Synthroid.  She did report having some brief episodes yesterday where she felt off balance and dizzy but those resolved quickly.  She does have hyperlipidemia based on recent lipid panel from October 24.  Her initial stroke evaluation was negative with NIH equals 0 and she was able to ambulate without any issues.  However, due to concerning story, I do feel that benefit of CTA of the head and neck to evaluate for any significant atherosclerosis is appropriate.  I did order oral meclizine since she still reported feeling slightly lightheaded and dizzy.  She denied any chest pain and story not suggestive of acute coronary syndrome or pulmonary embolism.    CTA was concerning for stenosis in the left M2 and left PCA arteries which could be

## 2024-11-04 NOTE — PLAN OF CARE
Problem: Pain  Goal: Verbalizes/displays adequate comfort level or baseline comfort level  Outcome: Progressing   No complaints of pain at this time, continuing to monitor and manage per MAR.     Problem: Safety - Adult  Goal: Free from fall injury  Outcome: Progressing   All fall precautions in place. Bed locked and in lowest position with alarm on. Overbed table and personal belonings within reach. Call light within reach and patient instructed to use call light for assistance. Non-skid socks on.

## 2024-11-04 NOTE — CONSULTS
Placed a call to [facility] @ [phone] for [facility] neurosurgery   RE:  [facility] - vasospasm per MD Nilay Cabral, NP called back @ [phone]    Dr. Cole, hospitalist accepted patient.     RM: [id number]  N2N: [phone]

## 2024-11-04 NOTE — PLAN OF CARE
[facility] Hospitalist Transfer accept note  Transfer center PS received  Case reviewed with ER physician  Reason for Transfer: Evaluation of severe stenosis of left MCA and left PCA    Rosa Crawford 59 y.o. female who presented to ED with a complaint of dizziness and light headedness for the past few days off and on.  Earlier yesterday had a more severe episode of dizziness which prompted her to come to ED.  In ED patient was asymptomatic and had fairly normal neuroexam.  CT head and CTA of the head and neck revealed:  1. No acute intracranial abnormality.  2. 5 mm segment of hemodynamically significant stenosis involving proximal M2  Sylvian branch of the left MCA. Distal branches appear normally opacified.  3. 7-8 mm segment of possible non hemodynamically significant stenosis at  proximal P2 segment of the left PCA.  4. These stenoses suggest the possibility of vasospasm.  5. No significant stenosis of the cervical carotid or vertebral arteries    Neurosurgery at the [facility] was consulted and recommended transferring the patient to the [facility] for further evaluation         Patient has been accepted for transfer to [facility].   Once patient arrive please page ON CALL HOSPITALIST so patient can be seen.   If unable to reach physician on PerfectServe please call hospitalist phone (#[phone])     PCP: Christie Villegas MD     Thanks  Anna Cole MD  Hospitalist

## 2024-11-04 NOTE — CARE COORDINATION
Case Management Assessment  Initial Evaluation    Date/Time of Evaluation: 11/4/2024 3:21 PM  Assessment Completed by: MARIEL Laguerre    If patient is discharged prior to next notation, then this note serves as note for discharge by case management.    Patient Name: Rosa Crawford                   YOB: 1965  Diagnosis: TIA (transient ischemic attack) [G45.9]                   Date / Time: 11/4/2024  8:09 AM    Patient Admission Status: Inpatient   Readmission Risk (Low < 19, Mod (19-27), High > 27): Readmission Risk Score: 4.7    Current PCP: Christie Villegas MD  PCP verified by CM? Yes    Chart Reviewed: Yes      History Provided by: Patient  Patient Orientation: Alert and Oriented    Patient Cognition: Alert    Hospitalization in the last 30 days (Readmission):  No    If yes, Readmission Assessment in CM Navigator will be completed.    Advance Directives:      Code Status: Full Code   Patient's Primary Decision Maker is: Legal Next of Kin    Primary Decision Maker: BaldomeroAron mas - Boyfriend - [phone]    Secondary Decision Maker: Fabian Reed - Child - [phone]    Supplemental (Other) Decision Maker: yasemin molina - Brother/Sister - [phone]    Discharge Planning:    Patient lives with: Spouse/Significant Other, Children Type of Home: House  Primary Care Giver: Self  Patient Support Systems include: Spouse/Significant Other, Children, Family Members   Current Financial resources: Other (Comment) (commercial insurance)  Current community resources: None  Current services prior to admission: None            Current DME:              Type of Home Care services:  None    ADLS  Prior functional level: Independent in ADLs/IADLs  Current functional level: Independent in ADLs/IADLs    PT AM-PAC:   /24  OT AM-PAC:   /24    Family can provide assistance at DC: Yes  Would you like Case Management to discuss the discharge plan with any other family members/significant others, and if so, who?  (0) No visual loss

## 2024-11-04 NOTE — CONSULTS
NAHUM placed a page to [facility] for acceptance of patient  Dr. Cole called back @ [phone] and accepted pt.

## 2024-11-04 NOTE — CONSULTS
An Padron MD        sodium chloride flush 0.9 % injection 5-40 mL  5-40 mL IntraVENous 2 times per day An Padron MD        sodium chloride flush 0.9 % injection 5-40 mL  5-40 mL IntraVENous PRN An Padron MD        0.9 % sodium chloride infusion   IntraVENous PRN An Padron MD        ondansetron (ZOFRAN-ODT) disintegrating tablet 4 mg  4 mg Oral Q8H PRN An aPdron MD        Or    ondansetron (ZOFRAN) injection 4 mg  4 mg IntraVENous Q6H PRN An Padron MD        polyethylene glycol (GLYCOLAX) packet 17 g  17 g Oral Daily PRN An Padron MD        enoxaparin (LOVENOX) injection 40 mg  40 mg SubCUTAneous Daily An Padron MD        atorvastatin (LIPITOR) tablet 80 mg  80 mg Oral Nightly nA Padron MD        [START ON 11/5/2024] aspirin chewable tablet 81 mg  81 mg Oral Daily An Padron MD        Or    [START ON 11/5/2024] aspirin suppository 300 mg  300 mg Rectal Daily An Padron MD            Objective:  BP (!) 139/92   Pulse 68   Temp 97.5 °F (36.4 °C) (Oral)   Resp 16   Ht 1.651 m (5' 5\")   Wt 88.4 kg (194 lb 14.2 oz)   SpO2 99%   BMI 32.43 kg/m²     Physical Exam:   Patient seen and examined  GCS:  4 - Opens eyes on own  5 - Alert and oriented  6 - Follows simple motor commands  General: Well developed. Alert and cooperative in no acute distress.     HENT: atraumatic, neck supple  Eyes: Optic discs: Not tested  Pulmonary: unlabored respiratory effort  Cardiovascular:  Warm well perfused. No peripheral edema  Gastrointestinal: abdomen soft, NT, ND    Neurological:  Mental Status: Awake, alert, oriented x 4, speech clear and appropriate  Attention: Intact  Language: No aphasia or dysarthria noted  Sensation: Intact to all extremities to light touch  Coordination: Intact    Cranial Nerves:  II: Visual acuity not tested, denies new visual changes / diplopia  III, IV, VI: PERRL, 3 mm bilaterally, EOMI, no nystagmus noted  V: Facial sensation intact

## 2024-11-04 NOTE — PROGRESS NOTES
Patient admitted to room 5517. Report received from night shift charge RN. VSS on room air. Patient oriented to room and call light. Rights and responsibilities provided to patient, and welcome packet provided to patient. 4 eyes skin assessment completed with 2nd RN.     Electronically signed by Khadijah Harrington RN on 11/4/2024 at 8:15 AM

## 2024-11-04 NOTE — H&P
atelectasis.     Due to CTA results, the ED discussed with the NSGY service who recommended pxt be transferred here for further eval and management.        Past Medical / Surgical History:    Past Medical History:   Diagnosis Date    Allergic rhinitis     Anxiety     can't sleep    Arthritis     Asthma     Chronic back pain 2000    Dental disease     Dizziness     Fracture of nasal bone     GERD (gastroesophageal reflux disease)     Hearing loss     Hypothyroidism 2004?    Osteoarthritis 2015    Tinnitus     TMJ dysfunction        Past Surgical History:   Procedure Laterality Date    ANKLE SURGERY  2016    CHOLECYSTECTOMY, LAPAROSCOPIC  2008    FRACTURE SURGERY  2016    MANDIBLE SURGERY      underbite    THYROIDECTOMY, PARTIAL Right 2003    hashimoto's on path, nodular to intiate    TUBAL LIGATION  1990    WRIST SURGERY  2015       Medications Prior to Admission:    No current facility-administered medications on file prior to encounter.     Current Outpatient Medications on File Prior to Encounter   Medication Sig Dispense Refill    vitamin D 50 MCG (2000 UT) CAPS capsule Take 1 capsule by mouth daily      levothyroxine (SYNTHROID) 150 MCG tablet Take 1 tablet by mouth daily 90 tablet 1    vitamin B-12 (CYANOCOBALAMIN) 100 MCG tablet Take 0.5 tablets by mouth daily         Allergies:  Dust mite extract and Seasonal    Social History:   TOBACCO:   reports that she has never smoked. She has never used smokeless tobacco.     ETOH:   reports current alcohol use of about 4.0 standard drinks of alcohol per week.    Family History:       Problem Relation Age of Onset    Diabetes Mother     High Blood Pressure Mother     Vision Loss Mother     Prostate Cancer Father         70s    Hearing Loss Father     Diabetes Paternal Uncle     Cancer Paternal Uncle     Prostate Cancer Paternal Uncle     Cancer Paternal Grandmother         stomach    Allergy (Severe) Sister     Asthma Sister        ROS: Review of Systems - Negative

## 2024-11-04 NOTE — ED NOTES
Report to transport team. Patient loaded to stretcher and covered with warm blanket. Personal belongings in bag with patient sticker. Patient is alert and oriented.

## 2024-11-04 NOTE — PROGRESS NOTES
4 Eyes Skin Assessment     NAME:  Rosa Crawford  YOB: 1965  MEDICAL RECORD NUMBER:  [id number]    The patient is being assessed for  Admission    I agree that at least one RN has performed a thorough Head to Toe Skin Assessment on the patient. ALL assessment sites listed below have been assessed.      Areas assessed by both nurses:    Head, Face, Ears, Shoulders, Back, Chest, Arms, Elbows, Hands, Sacrum. Buttock, Coccyx, Ischium, Legs. Feet and Heels, and Under Medical Devices         Does the Patient have a Wound? No noted wound(s)       Apollo Prevention initiated by RN: Yes  Wound Care Orders initiated by RN: No    Pressure Injury (Stage 3,4, Unstageable, DTI, NWPT, and Complex wounds) if present, place Wound referral order by RN under : No    New Ostomies, if present place, Ostomy referral order under : No     Nurse 1 eSignature: Electronically signed by Khadijah Harrington RN on 11/4/24 at 8:16 AM EST    **SHARE this note so that the co-signing nurse can place an eSignature**    Nurse 2 eSignature: Electronically signed by Delia Pastor RN on 11/4/24 at 8:20 AM EST

## 2024-11-05 VITALS
DIASTOLIC BLOOD PRESSURE: 93 MMHG | BODY MASS INDEX: 32.47 KG/M2 | WEIGHT: 194.89 LBS | TEMPERATURE: 98.5 F | SYSTOLIC BLOOD PRESSURE: 145 MMHG | OXYGEN SATURATION: 97 % | HEIGHT: 65 IN | RESPIRATION RATE: 16 BRPM | HEART RATE: 72 BPM

## 2024-11-05 LAB
ANION GAP SERPL CALCULATED.3IONS-SCNC: 11 MMOL/L (ref 3–16)
BUN SERPL-MCNC: 16 MG/DL (ref 7–20)
CALCIUM SERPL-MCNC: 9.2 MG/DL (ref 8.3–10.6)
CHLORIDE SERPL-SCNC: 102 MMOL/L (ref 99–110)
CHOLEST SERPL-MCNC: 240 MG/DL (ref 0–199)
CO2 SERPL-SCNC: 23 MMOL/L (ref 21–32)
CREAT SERPL-MCNC: 0.6 MG/DL (ref 0.6–1.1)
DEPRECATED RDW RBC AUTO: 14.4 % (ref 12.4–15.4)
EST. AVERAGE GLUCOSE BLD GHB EST-MCNC: 105.4 MG/DL
GFR SERPLBLD CREATININE-BSD FMLA CKD-EPI: >90 ML/MIN/{1.73_M2}
GLUCOSE SERPL-MCNC: 91 MG/DL (ref 70–99)
HBA1C MFR BLD: 5.3 %
HCT VFR BLD AUTO: 40.2 % (ref 36–48)
HDLC SERPL-MCNC: 84 MG/DL (ref 40–60)
HGB BLD-MCNC: 13.4 G/DL (ref 12–16)
LDLC SERPL CALC-MCNC: 141 MG/DL
MCH RBC QN AUTO: 32.4 PG (ref 26–34)
MCHC RBC AUTO-ENTMCNC: 33.3 G/DL (ref 31–36)
MCV RBC AUTO: 97.4 FL (ref 80–100)
PLATELET # BLD AUTO: 283 K/UL (ref 135–450)
PMV BLD AUTO: 8.5 FL (ref 5–10.5)
POTASSIUM SERPL-SCNC: 4.7 MMOL/L (ref 3.5–5.1)
RBC # BLD AUTO: 4.13 M/UL (ref 4–5.2)
SODIUM SERPL-SCNC: 136 MMOL/L (ref 136–145)
TRIGL SERPL-MCNC: 73 MG/DL (ref 0–150)
VLDLC SERPL CALC-MCNC: 15 MG/DL
WBC # BLD AUTO: 8.2 K/UL (ref 4–11)

## 2024-11-05 PROCEDURE — 85027 COMPLETE CBC AUTOMATED: CPT

## 2024-11-05 PROCEDURE — 2580000003 HC RX 258: Performed by: INTERNAL MEDICINE

## 2024-11-05 PROCEDURE — 87086 URINE CULTURE/COLONY COUNT: CPT

## 2024-11-05 PROCEDURE — 99232 SBSQ HOSP IP/OBS MODERATE 35: CPT | Performed by: NURSE PRACTITIONER

## 2024-11-05 PROCEDURE — 80048 BASIC METABOLIC PNL TOTAL CA: CPT

## 2024-11-05 PROCEDURE — 6360000002 HC RX W HCPCS: Performed by: INTERNAL MEDICINE

## 2024-11-05 PROCEDURE — 6370000000 HC RX 637 (ALT 250 FOR IP): Performed by: INTERNAL MEDICINE

## 2024-11-05 PROCEDURE — 87077 CULTURE AEROBIC IDENTIFY: CPT

## 2024-11-05 PROCEDURE — 36415 COLL VENOUS BLD VENIPUNCTURE: CPT

## 2024-11-05 RX ORDER — CEFDINIR 300 MG/1
300 CAPSULE ORAL 2 TIMES DAILY
Qty: 10 CAPSULE | Refills: 0 | Status: SHIPPED | OUTPATIENT
Start: 2024-11-05 | End: 2024-11-10

## 2024-11-05 RX ADMIN — CEFTRIAXONE 1000 MG: 1 INJECTION, POWDER, FOR SOLUTION INTRAMUSCULAR; INTRAVENOUS at 05:12

## 2024-11-05 RX ADMIN — ENOXAPARIN SODIUM 40 MG: 100 INJECTION SUBCUTANEOUS at 08:15

## 2024-11-05 RX ADMIN — ASPIRIN 81 MG: 81 TABLET, CHEWABLE ORAL at 08:15

## 2024-11-05 RX ADMIN — SODIUM CHLORIDE, PRESERVATIVE FREE 10 ML: 5 INJECTION INTRAVENOUS at 08:15

## 2024-11-05 RX ADMIN — LEVOTHYROXINE SODIUM 150 MCG: 150 TABLET ORAL at 08:15

## 2024-11-05 NOTE — PROGRESS NOTES
NEUROSURGERY  PROGRESS NOTE    Patient Name: Rosa Crawford YOB: 1965   Sex: Female Age: 59 yrs     Medical Record Number: [id number] Acct Number: [id number]   Room Number: 5517/5517-01 Hospital Day: Hospital Day: 2     Interval History:      Subjective: Pt dizziness is gone. Dr. Chang reviewed images and brain MRI and MRA are normal, he does not agree with the read that she has stenosis os LMCA and PCA    Objective:    VITAL SIGNS   BP (!) 145/93   Pulse 72   Temp 98.5 °F (36.9 °C) (Oral)   Resp 16   Ht 1.651 m (5' 5\")   Wt 88.4 kg (194 lb 14.2 oz)   SpO2 97%   BMI 32.43 kg/m²    Height Height: 165.1 cm (5' 5\")   Weight Weight - Scale: 88.4 kg (194 lb 14.2 oz)        Allergies Allergies   Allergen Reactions    Dust Mite Extract     Seasonal       NPO Status ADULT DIET; Regular   Isolation No active isolations     LABS   Basic Metabolic Profile Recent Labs     11/04/24 0154 11/05/24  0625    136    102   CO2 24 23   BUN 21* 16   CREATININE 0.8 0.6   GLUCOSE 94 91   MG 2.21  --       Complete Blood Count Recent Labs     11/04/24 0154 11/05/24  0625   WBC 9.3 8.2   RBC 4.05 4.13      Coagulation Studies Recent Labs     11/04/24 0154   INR 0.90        MEDICATIONS   Inpatient Medications     levothyroxine, 150 mcg, Oral, Daily    sodium chloride flush, 5-40 mL, IntraVENous, 2 times per day    enoxaparin, 40 mg, SubCUTAneous, Daily    atorvastatin, 80 mg, Oral, Nightly    aspirin, 81 mg, Oral, Daily **OR** aspirin, 300 mg, Rectal, Daily    cefTRIAXone (ROCEPHIN) IV, 1,000 mg, IntraVENous, Q24H   Infusions    sodium chloride        Antibiotics   Recent Abx Admin                     cefTRIAXone (ROCEPHIN) 1,000 mg in sterile water 10 mL IV syringe (mg) 1,000 mg Given 11/05/24 0512                     Neurologic

## 2024-11-05 NOTE — PROGRESS NOTES
Occupational Therapy/Physical Therapy     Chart reviewed, RN reports pt is up ad tawanda. Spoke with pt who states she is at baseline with no acute skilled OT/PT needs or equipment. Will sign off. Please re-order if pt has a change in status.    Radha Marcos, OTR/L  Ama Bryan, PT, DPT  [id number]

## 2024-11-05 NOTE — PLAN OF CARE
Problem: Pain  Goal: Verbalizes/displays adequate comfort level or baseline comfort level  11/5/2024 0741 by Khadijah Harrington, RN  Outcome: Progressing   No complaints of pain at this time, continuing to monitor and manage per MAR.     Problem: Safety - Adult  Goal: Free from fall injury  11/5/2024 0741 by Khadijah Harrington, RN  Outcome: Progressing   All fall precautions in place. Bed locked and in lowest position with alarm on. Overbed table and personal belonings within reach. Call light within reach and patient instructed to use call light for assistance. Non-skid socks on.

## 2024-11-05 NOTE — PROGRESS NOTES
All discharge instructions reviewed with patient. All questions and concerns addressed at this time. IV removed without complication. Patient belongings gathered. Patient taken downstairs via wheelchair to the lobby to be picked up for discharge.    Electronically signed by Khadijah Harrington RN on 11/5/2024 at 5:01 PM

## 2024-11-05 NOTE — PROGRESS NOTES
Pt A&Ox4. VSS on RA. Ambulation SBA. No c/o pain thus far. Voiding via BRP. Tolerating PO intake. Denies needs at this time. Safety precautions in place, call light within reach.

## 2024-11-05 NOTE — PLAN OF CARE
Problem: Discharge Planning  Goal: Discharge to home or other facility with appropriate resources  11/4/2024 2133 by Aline Diaz, RN  Outcome: Progressing     Problem: Pain  Goal: Verbalizes/displays adequate comfort level or baseline comfort level  11/4/2024 2133 by Aline Diaz, RN  Outcome: Progressing  Note: No c/o pain thus far.      Problem: Safety - Adult  Goal: Free from fall injury  11/4/2024 2133 by Aline Diaz, RN  Outcome: Progressing

## 2024-11-05 NOTE — DISCHARGE SUMMARY
to vasospasm.     EKG was normal.      CXR: nil acute, mild left basilar atelectasis.      Due to CTA results, the ED discussed with the NSGY service who recommended pxt be transferred here for further eval and management.        Brief hospital course:  Given the concern of the patients presentation and the concern of the possible multi-factorial etiology contributing to patients symptomatology. Patient was admitted and evaluated and found to have       Discharge Diagnoses:     Dizziness possibly sec to UTI and hypothyroidism: POA     Probable complicated UTI  - Abnormal UA, possible dizziness     Hypothyroidism     Anxiety     Gastroesophageal reflux disease with esophagitis             She was sent from [place] ED, due to concern for abnormal CTA in setting of presentation with dizziness, and concern for TIA/CVA and abnormal CTA for neurosurgery evaluation.      Does not appear to have much by way of risk factors for TIA/CVA.      No current neuro deficit, therefore was felt unlikely CVA.      We obtained MRI/MRA to r/o stroke, and eval the abnormal finding noted on CTA. They are both negative.    Was seen by Neurosurgery Dr. Chang who does not agree with this read of the CTA; and her MRI and MRA are normal. Hence stroke as well as stenosis or any other abnormality of the central circulation as well as spasm considered ruled out at this time. Not POA    Currently patient doing well; and no complaints.      Would appear her symptoms may have been sec to possible metabolic etiology with possible UTI and hypothyroidism    Her urine showed trace leuc esterase and rare bacteria, although no significant pyuria. We have empirically placed on antibx, pending urine culture which I ordered: discussed with her to follow up with this with PCP.     Also ordered B12 level: she can follow with PCP for this.      Of note, she is hypothyroid, and although her symptoms may not be classic for hypothyroidism, her TSH was

## 2024-11-05 NOTE — CARE COORDINATION
Case Management Assessment            Discharge Note                    Date / Time of Note: 11/5/2024 10:37 AM                  Discharge Note Completed by: MARIEL Laguerre    Patient Name: Rosa Crawford   YOB: 1965  Diagnosis: TIA (transient ischemic attack) [G45.9]   Date / Time: 11/4/2024  8:09 AM    Current PCP: Christie Villegas MD  Clinic patient: No    Hospitalization in the last 30 days: No       Advance Directives:  Code Status: Full Code  [place] DNR form completed and on chart: Not Indicated    Financial:  Payor: AETNA / Plan: AETNA / Product Type: *No Product type* /      Pharmacy:    [facility]/pharmacy #[id number] - [place], [place] - [address] - P [phone] - F [phone] [address]  [facility] [address]  Phone: [phone] Fax: [phone]      Assistance purchasing medications?: Potential Assistance Purchasing Medications: No  Assistance provided by Case Management: None at this time    Does patient want to participate in local refill/ meds to beds program?:      Meds To Beds General Rules:  1. Can ONLY be done Monday- Friday between 8:30am-5pm  2. Prescription(s) must be in pharmacy by 3pm to be filled same day  3.Copy of patient's insurance/ prescription drug card and patient face sheet must be sent along with the prescription(s)  4. Cost of Rx cannot be added to hospital bill. If financial assistance is needed, please contact unit  or ;  or  CANNOT provide pharmacy voucher for patients co-pays  5. Patients can then  the prescription on their way out of the hospital at discharge, or pharmacy can deliver to the bedside if staff is available. (payment due at time of pick-up or delivery - cash, check, or card accepted)     Able to afford home medications/ co-pay costs: Yes    ADLS:  Current PT AM-PAC Score:   /24  Current OT AM-PAC Score:   /24    DISCHARGE Disposition: Home- No Services

## 2024-11-05 NOTE — ACP (ADVANCE CARE PLANNING)
Advance Care Planning     Advance Care Planning Inpatient Note  [facility]    Today's Date: 11/5/2024  Unit: [facility] 5T ORTHO/NEURO    Received request from IDT Member.  Upon review of chart and communication with care team, patient's decision making abilities are not in question.. Patient was/were present in the room during visit.    Goals of ACP Conversation:  Discuss advance care planning documents    Health Care Decision Makers:       Primary Decision Maker: Fabian Reed - Child - [phone]    Secondary Decision Maker: Katy Wyatt - Brother/Sister - [phone]  Summary:  Updated Healthcare Decision Maker    Advance Care Planning Documents (Patient Wishes):  Healthcare Power of /Advance Directive Appointment of Health Care Agent  Living Will/Advance Directive     Assessment:   offered education on Advanced Directives for better understanding. Pt expressed understanding. Pt stated that she wants her son, Fabian to make her healthcare decisions, while his sister, Katy will be a secondary person. Pt has three two other children. Pt also currently has a boyfriend, Aron who lives with her. Pt likes to read through document before completing it.  gave pt a blank copy of Advanced Directives. Pt will notify RN to put in a new consult when ready. [facility] is available to assist pt when ready.    Interventions:  Provided education on documents for clarity and greater understanding  Discussed and provided education on state decision maker hierarchy  Reviewed but did not complete ACP document    Outcomes/Plan:  ACP Discussion: Postponed  Teach Back Method used to verify the patient's and/or Healthcare Decision Maker's understanding of key information in the advance directive documents    Electronically signed by Chaplain Ab on 11/5/2024 at 12:32 PM

## 2024-11-06 ENCOUNTER — TELEPHONE (OUTPATIENT)
Dept: FAMILY MEDICINE CLINIC | Age: 59
End: 2024-11-06

## 2024-11-06 LAB
BACTERIA UR CULT: ABNORMAL
ORGANISM: ABNORMAL

## 2024-11-06 NOTE — TELEPHONE ENCOUNTER
Care Transitions Initial Follow Up Call    Outreach made within 2 business days of discharge: Yes    Patient: Rosa Crawford Patient : 1965   MRN: [id number]  Reason for Admission: TIA  Discharge Date: 24       Spoke with: Called Patient no answer, left a message for the patient to return the call to the office.    Discharge department/facility: [facility]        Scheduled appointment with PCP within 7-14 days    Follow Up  Future Appointments   Date Time Provider Department Center   1/3/2025  1:00 PM Christie Villegas MD [facility] DEP       Margarette Moore LPN

## 2024-11-06 NOTE — TELEPHONE ENCOUNTER
Care Transitions Initial Follow Up Call    Outreach made within 2 business days of discharge: Yes    Patient: Rosa Crawford Patient : 1965   MRN: [id number]  Reason for Admission: TIA, UTI  Discharge Date: 24       Spoke with: Rosa     Discharge department/facility: [facility] Interactive Patient Contact:  Was patient able to fill all prescriptions: Yes  Was patient instructed to bring all medications to the follow-up visit: Yes  Is patient taking all medications as directed in the discharge summary? Yes  Does patient understand their discharge instructions: Yes  Does patient have questions or concerns that need addressed prior to 7-14 day follow up office visit: no        Scheduled appointment with PCP within 7-14 days    Follow Up  Future Appointments   Date Time Provider Department Center   11/15/2024  2:30 PM Christie Villegas MD [facility] DEP   1/3/2025  1:00 PM Christie Villegas MD [facility] DEP       Margarette Moore LPN

## 2024-11-22 ENCOUNTER — TELEMEDICINE (OUTPATIENT)
Dept: FAMILY MEDICINE CLINIC | Age: 59
End: 2024-11-22
Payer: COMMERCIAL

## 2024-11-22 DIAGNOSIS — R42 LIGHTHEADEDNESS: Primary | ICD-10-CM

## 2024-11-22 DIAGNOSIS — E03.9 ACQUIRED HYPOTHYROIDISM: ICD-10-CM

## 2024-11-22 PROCEDURE — 99214 OFFICE O/P EST MOD 30 MIN: CPT | Performed by: STUDENT IN AN ORGANIZED HEALTH CARE EDUCATION/TRAINING PROGRAM

## 2024-11-22 ASSESSMENT — ENCOUNTER SYMPTOMS
COUGH: 0
ABDOMINAL PAIN: 0
VOMITING: 0
SHORTNESS OF BREATH: 0
NAUSEA: 0

## 2024-11-22 NOTE — PROGRESS NOTES
Rosa Crawford, was evaluated through a synchronous (real-time) audio-video encounter. The patient (or guardian if applicable) is aware that this is a billable service, which includes applicable co-pays. This Virtual Visit was conducted with patient's (and/or legal guardian's) consent. Patient identification was verified, and a caregiver was present when appropriate.   The patient was located at Home: [address]  [facility] [address]  Provider was located at Facility (Appt Dept): [address]  [address]  [place] [address]  Confirm you are appropriately licensed, registered, or certified to deliver care in the state where the patient is located as indicated above. If you are not or unsure, please re-schedule the visit: Yes, I confirm.     Rosa Crawford (:  1965) is a Established patient, presenting virtually for evaluation of the following:    Below is the assessment and plan developed based on review of pertinent history, physical exam, labs, studies, and medications.     Assessment & Plan  Lightheadedness    Intermittent lightheadedness and dizziness since hospitalization ; not positional, not orthostatic, unclear triggers, no current symptoms. Recommend increasing hydration. If reoccurs, recommend checking blood pressure and blood sugar. Will obtain labs below.     Orders:    CBC; Future    Basic Metabolic Panel; Future    Magnesium; Future    Acquired hypothyroidism    Hx of Hashimoto's thyroiditis with significant family hx s/p left thyroid lobectomy with US of thyroid last year with right thyroid lobe likely reflects a chronic thyroiditis such as Hashimoto's. Restarted on synthroid 150mcg at last visit, ab neg - will repeat labs and adjust synthroid as needed.     Orders:    TSH with Reflex to FT4; Future         Subjective   Here for hospital follow-up.     She was hospitalized on  for dizziness. She woke up to go to the bathroom around 12:30 in the morning, and on her way

## 2024-11-22 NOTE — ASSESSMENT & PLAN NOTE
Hx of Hashimoto's thyroiditis with significant family hx s/p left thyroid lobectomy with US of thyroid last year with right thyroid lobe likely reflects a chronic thyroiditis such as Hashimoto's. Restarted on synthroid 150mcg at last visit, ab neg - will repeat labs and adjust synthroid as needed.     Orders:    TSH with Reflex to FT4; Future

## 2024-12-10 ENCOUNTER — PATIENT MESSAGE (OUTPATIENT)
Dept: FAMILY MEDICINE CLINIC | Age: 59
End: 2024-12-10

## 2024-12-10 DIAGNOSIS — Z87.440 HX OF CYSTITIS: Primary | ICD-10-CM

## 2024-12-10 NOTE — TELEPHONE ENCOUNTER
Left message for patient to call the office.    UA with Reflex to culture pended.    Please advise.

## 2024-12-11 ENCOUNTER — HOSPITAL ENCOUNTER (OUTPATIENT)
Age: 59
Discharge: HOME | End: 2024-12-11
Payer: COMMERCIAL

## 2024-12-11 DIAGNOSIS — R42 LIGHTHEADEDNESS: ICD-10-CM

## 2024-12-11 DIAGNOSIS — E03.9 ACQUIRED HYPOTHYROIDISM: ICD-10-CM

## 2024-12-11 LAB
ANION GAP SERPL CALCULATED.3IONS-SCNC: 11 MMOL/L (ref 3–16)
BACTERIA URNS QL MICRO: ABNORMAL /HPF
BILIRUB UR QL STRIP.AUTO: NEGATIVE
BUN SERPL-MCNC: 10 MG/DL (ref 7–20)
CALCIUM SERPL-MCNC: 9.8 MG/DL (ref 8.3–10.6)
CHLORIDE SERPL-SCNC: 102 MMOL/L (ref 99–110)
CLARITY UR: ABNORMAL
CO2 SERPL-SCNC: 25 MMOL/L (ref 21–32)
COLOR UR: YELLOW
CREAT SERPL-MCNC: 0.6 MG/DL (ref 0.6–1.1)
DEPRECATED RDW RBC AUTO: 13.8 % (ref 12.4–15.4)
EPI CELLS #/AREA URNS HPF: ABNORMAL /HPF (ref 0–5)
GFR SERPLBLD CREATININE-BSD FMLA CKD-EPI: >90 ML/MIN/{1.73_M2}
GLUCOSE SERPL-MCNC: 100 MG/DL (ref 70–99)
GLUCOSE UR STRIP.AUTO-MCNC: NEGATIVE MG/DL
HCT VFR BLD AUTO: 38.9 % (ref 36–48)
HGB BLD-MCNC: 13.2 G/DL (ref 12–16)
HGB UR QL STRIP.AUTO: NEGATIVE
KETONES UR STRIP.AUTO-MCNC: NEGATIVE MG/DL
LEUKOCYTE ESTERASE UR QL STRIP.AUTO: ABNORMAL
MAGNESIUM SERPL-MCNC: 2.15 MG/DL (ref 1.8–2.4)
MCH RBC QN AUTO: 32.3 PG (ref 26–34)
MCHC RBC AUTO-ENTMCNC: 33.8 G/DL (ref 31–36)
MCV RBC AUTO: 95.5 FL (ref 80–100)
MUCOUS THREADS #/AREA URNS LPF: ABNORMAL /LPF
NITRITE UR QL STRIP.AUTO: NEGATIVE
PH UR STRIP.AUTO: 6 [PH] (ref 5–8)
PLATELET # BLD AUTO: 325 K/UL (ref 135–450)
PMV BLD AUTO: 10 FL (ref 5–10.5)
POTASSIUM SERPL-SCNC: 4.3 MMOL/L (ref 3.5–5.1)
PROT UR STRIP.AUTO-MCNC: NEGATIVE MG/DL
RBC # BLD AUTO: 4.07 M/UL (ref 4–5.2)
RBC #/AREA URNS HPF: ABNORMAL /HPF (ref 0–4)
SODIUM SERPL-SCNC: 138 MMOL/L (ref 136–145)
SP GR UR STRIP.AUTO: 1.01 (ref 1–1.03)
TSH SERPL DL<=0.005 MIU/L-ACNC: 0.33 UIU/ML (ref 0.27–4.2)
UA COMPLETE W REFLEX CULTURE PNL UR: ABNORMAL
UA DIPSTICK W REFLEX MICRO PNL UR: YES
URN SPEC COLLECT METH UR: ABNORMAL
UROBILINOGEN UR STRIP-ACNC: 0.2 E.U./DL
WBC # BLD AUTO: 8 K/UL (ref 4–11)
WBC #/AREA URNS HPF: ABNORMAL /HPF (ref 0–5)

## 2024-12-11 PROCEDURE — 83735 ASSAY OF MAGNESIUM: CPT

## 2024-12-11 PROCEDURE — 85027 COMPLETE CBC AUTOMATED: CPT

## 2024-12-11 PROCEDURE — 80048 BASIC METABOLIC PNL TOTAL CA: CPT

## 2024-12-11 PROCEDURE — 36415 COLL VENOUS BLD VENIPUNCTURE: CPT

## 2024-12-11 PROCEDURE — 84443 ASSAY THYROID STIM HORMONE: CPT

## 2024-12-11 PROCEDURE — 81001 URINALYSIS AUTO W/SCOPE: CPT

## 2024-12-23 ENCOUNTER — OFFICE VISIT (OUTPATIENT)
Dept: FAMILY MEDICINE CLINIC | Age: 59
End: 2024-12-23
Payer: COMMERCIAL

## 2024-12-23 ENCOUNTER — PATIENT MESSAGE (OUTPATIENT)
Dept: FAMILY MEDICINE CLINIC | Age: 59
End: 2024-12-23

## 2024-12-23 VITALS
SYSTOLIC BLOOD PRESSURE: 122 MMHG | OXYGEN SATURATION: 98 % | HEIGHT: 65 IN | TEMPERATURE: 96.9 F | DIASTOLIC BLOOD PRESSURE: 78 MMHG | BODY MASS INDEX: 32.43 KG/M2 | HEART RATE: 78 BPM

## 2024-12-23 DIAGNOSIS — E03.9 ACQUIRED HYPOTHYROIDISM: ICD-10-CM

## 2024-12-23 DIAGNOSIS — N39.0 URINARY TRACT INFECTION WITHOUT HEMATURIA, SITE UNSPECIFIED: Primary | ICD-10-CM

## 2024-12-23 LAB
BILIRUBIN, POC: NEGATIVE
BLOOD URINE, POC: NEGATIVE
CLARITY, POC: CLEAR
COLOR, POC: YELLOW
GLUCOSE URINE, POC: NEGATIVE MG/DL
KETONES, POC: NEGATIVE MG/DL
LEUKOCYTE EST, POC: NEGATIVE
NITRITE, POC: NEGATIVE
PH, POC: 6
PROTEIN, POC: NEGATIVE MG/DL
SPECIFIC GRAVITY, POC: 1.01
UROBILINOGEN, POC: 0.2 MG/DL

## 2024-12-23 PROCEDURE — 99214 OFFICE O/P EST MOD 30 MIN: CPT | Performed by: FAMILY MEDICINE

## 2024-12-23 PROCEDURE — 81002 URINALYSIS NONAUTO W/O SCOPE: CPT | Performed by: FAMILY MEDICINE

## 2024-12-23 RX ORDER — MELOXICAM 15 MG/1
TABLET ORAL
COMMUNITY
Start: 2024-10-01 | End: 2024-12-23

## 2024-12-23 RX ORDER — DICLOFENAC SODIUM 75 MG/1
TABLET, DELAYED RELEASE ORAL
COMMUNITY

## 2024-12-23 ASSESSMENT — PATIENT HEALTH QUESTIONNAIRE - PHQ9
2. FEELING DOWN, DEPRESSED OR HOPELESS: NOT AT ALL
4. FEELING TIRED OR HAVING LITTLE ENERGY: SEVERAL DAYS
5. POOR APPETITE OR OVEREATING: NOT AT ALL
SUM OF ALL RESPONSES TO PHQ QUESTIONS 1-9: 2
3. TROUBLE FALLING OR STAYING ASLEEP: SEVERAL DAYS
6. FEELING BAD ABOUT YOURSELF - OR THAT YOU ARE A FAILURE OR HAVE LET YOURSELF OR YOUR FAMILY DOWN: NOT AT ALL
8. MOVING OR SPEAKING SO SLOWLY THAT OTHER PEOPLE COULD HAVE NOTICED. OR THE OPPOSITE, BEING SO FIGETY OR RESTLESS THAT YOU HAVE BEEN MOVING AROUND A LOT MORE THAN USUAL: NOT AT ALL
1. LITTLE INTEREST OR PLEASURE IN DOING THINGS: NOT AT ALL
10. IF YOU CHECKED OFF ANY PROBLEMS, HOW DIFFICULT HAVE THESE PROBLEMS MADE IT FOR YOU TO DO YOUR WORK, TAKE CARE OF THINGS AT HOME, OR GET ALONG WITH OTHER PEOPLE: NOT DIFFICULT AT ALL
7. TROUBLE CONCENTRATING ON THINGS, SUCH AS READING THE NEWSPAPER OR WATCHING TELEVISION: NOT AT ALL
SUM OF ALL RESPONSES TO PHQ9 QUESTIONS 1 & 2: 0
SUM OF ALL RESPONSES TO PHQ QUESTIONS 1-9: 2
9. THOUGHTS THAT YOU WOULD BE BETTER OFF DEAD, OR OF HURTING YOURSELF: NOT AT ALL

## 2024-12-23 ASSESSMENT — ANXIETY QUESTIONNAIRES
3. WORRYING TOO MUCH ABOUT DIFFERENT THINGS: SEVERAL DAYS
4. TROUBLE RELAXING: NOT AT ALL
2. NOT BEING ABLE TO STOP OR CONTROL WORRYING: NOT AT ALL
6. BECOMING EASILY ANNOYED OR IRRITABLE: SEVERAL DAYS
7. FEELING AFRAID AS IF SOMETHING AWFUL MIGHT HAPPEN: NOT AT ALL
1. FEELING NERVOUS, ANXIOUS, OR ON EDGE: NOT AT ALL
GAD7 TOTAL SCORE: 2
IF YOU CHECKED OFF ANY PROBLEMS ON THIS QUESTIONNAIRE, HOW DIFFICULT HAVE THESE PROBLEMS MADE IT FOR YOU TO DO YOUR WORK, TAKE CARE OF THINGS AT HOME, OR GET ALONG WITH OTHER PEOPLE: NOT DIFFICULT AT ALL
5. BEING SO RESTLESS THAT IT IS HARD TO SIT STILL: NOT AT ALL

## 2024-12-23 NOTE — PROGRESS NOTES
Rosa Crawford is a 59 y.o. female    Chief Complaint   Patient presents with    Urinary Tract Infection    Thyroid Problem       HPI:    HPI    This is a new patient to me.    Patient is concerned for a UTI.  She was seen a month ago and was found to have a major UTI.  She did not really have any urinary symptoms at that time and currently does not either.  No current dysuria, pain with urination or burning with urination.  She does feel little suprapubic pressure.  She wants to make sure she does not have a UTI.    Acquired hypothyroidism.  This is a chronic issue.  The patient takes her medicine in the morning on an empty stomach.  Her recent TSH was within normal limits.      ROS:    Review of Systems   Genitourinary:  Negative for dysuria and hematuria.       /78 (Site: Right Upper Arm, Position: Sitting, Cuff Size: Large Adult)   Pulse 78   Temp 96.9 °F (36.1 °C) (Temporal)   Ht 1.651 m (5' 5\")   SpO2 98%   BMI 32.43 kg/m²     Physical Exam:    Physical Exam  Constitutional:       General: She is not in acute distress.     Appearance: Normal appearance. She is obese. She is not ill-appearing or toxic-appearing.   HENT:      Head: Normocephalic.      Mouth/Throat:      Mouth: Mucous membranes are moist.      Pharynx: Oropharynx is clear. No oropharyngeal exudate or posterior oropharyngeal erythema.      Comments: + postnasal drip findings present  Neurological:      Mental Status: She is alert.   Psychiatric:         Mood and Affect: Mood normal.         Behavior: Behavior normal.         Thought Content: Thought content normal.         Current Outpatient Medications   Medication Sig Dispense Refill    diclofenac (VOLTAREN) 75 MG EC tablet TAKE 1 TABLET BY MOUTH 2 TIMES DAILY  DAYS.      vitamin D 50 MCG (2000 UT) CAPS capsule Take 1 capsule by mouth daily      levothyroxine (SYNTHROID) 150 MCG tablet Take 1 tablet by mouth daily 90 tablet 1    vitamin B-12 (CYANOCOBALAMIN) 100 MCG tablet Take

## 2024-12-24 LAB — BACTERIA UR CULT: NORMAL

## 2025-01-08 ENCOUNTER — OFFICE VISIT (OUTPATIENT)
Dept: FAMILY MEDICINE CLINIC | Age: 60
End: 2025-01-08

## 2025-01-08 VITALS
TEMPERATURE: 97.9 F | SYSTOLIC BLOOD PRESSURE: 124 MMHG | DIASTOLIC BLOOD PRESSURE: 72 MMHG | OXYGEN SATURATION: 98 % | HEART RATE: 72 BPM | BODY MASS INDEX: 32.49 KG/M2 | WEIGHT: 195 LBS | HEIGHT: 65 IN

## 2025-01-08 DIAGNOSIS — J06.9 VIRAL URI: Primary | ICD-10-CM

## 2025-01-08 LAB
INFLUENZA A ANTIBODY: NORMAL
INFLUENZA B ANTIBODY: NORMAL
S PYO AG THROAT QL: NORMAL

## 2025-01-08 RX ORDER — FLUTICASONE PROPIONATE 50 MCG
1 SPRAY, SUSPENSION (ML) NASAL DAILY
Qty: 16 G | Refills: 0 | Status: SHIPPED | OUTPATIENT
Start: 2025-01-08

## 2025-01-08 ASSESSMENT — ENCOUNTER SYMPTOMS
SINUS PRESSURE: 1
NAUSEA: 0
COUGH: 1
ABDOMINAL PAIN: 0
VOMITING: 0
SORE THROAT: 1
SHORTNESS OF BREATH: 0

## 2025-01-08 ASSESSMENT — PATIENT HEALTH QUESTIONNAIRE - PHQ9
2. FEELING DOWN, DEPRESSED OR HOPELESS: NOT AT ALL
DEPRESSION UNABLE TO ASSESS: FUNCTIONAL CAPACITY MOTIVATION LIMITS ACCURACY
SUM OF ALL RESPONSES TO PHQ QUESTIONS 1-9: 0
SUM OF ALL RESPONSES TO PHQ9 QUESTIONS 1 & 2: 0
1. LITTLE INTEREST OR PLEASURE IN DOING THINGS: NOT AT ALL
SUM OF ALL RESPONSES TO PHQ QUESTIONS 1-9: 0

## 2025-01-08 NOTE — PROGRESS NOTES
C/o vaginal bleeding x 2 days. 3-4 weeks pregnant. . 1  2 miscarriage. c/o abd cramping yesterday,none today. Also had nose bleed this am- resolved.
General: Skin is warm and dry.   Neurological:      Mental Status: She is alert.   Psychiatric:         Mood and Affect: Mood normal.         Christie Villegas MD    This dictation was generated by voice recognition computer software.  Although all attempts are made to edit the dictation for accuracy, there may be errors in the transcription that are not intended.

## 2025-01-24 ENCOUNTER — OFFICE VISIT (OUTPATIENT)
Dept: FAMILY MEDICINE CLINIC | Age: 60
End: 2025-01-24
Payer: COMMERCIAL

## 2025-01-24 VITALS
SYSTOLIC BLOOD PRESSURE: 118 MMHG | HEART RATE: 66 BPM | DIASTOLIC BLOOD PRESSURE: 70 MMHG | OXYGEN SATURATION: 99 % | HEIGHT: 65 IN | TEMPERATURE: 97.1 F | WEIGHT: 196 LBS | BODY MASS INDEX: 32.65 KG/M2

## 2025-01-24 DIAGNOSIS — Z12.12 ENCOUNTER FOR COLORECTAL CANCER SCREENING USING COLOGUARD TEST: ICD-10-CM

## 2025-01-24 DIAGNOSIS — Z00.00 ANNUAL PHYSICAL EXAM: Primary | ICD-10-CM

## 2025-01-24 DIAGNOSIS — Z12.11 ENCOUNTER FOR COLORECTAL CANCER SCREENING USING COLOGUARD TEST: ICD-10-CM

## 2025-01-24 DIAGNOSIS — E03.9 ACQUIRED HYPOTHYROIDISM: ICD-10-CM

## 2025-01-24 PROCEDURE — 99396 PREV VISIT EST AGE 40-64: CPT | Performed by: STUDENT IN AN ORGANIZED HEALTH CARE EDUCATION/TRAINING PROGRAM

## 2025-01-24 SDOH — ECONOMIC STABILITY: FOOD INSECURITY: WITHIN THE PAST 12 MONTHS, THE FOOD YOU BOUGHT JUST DIDN'T LAST AND YOU DIDN'T HAVE MONEY TO GET MORE.: NEVER TRUE

## 2025-01-24 SDOH — ECONOMIC STABILITY: FOOD INSECURITY: WITHIN THE PAST 12 MONTHS, YOU WORRIED THAT YOUR FOOD WOULD RUN OUT BEFORE YOU GOT MONEY TO BUY MORE.: NEVER TRUE

## 2025-01-24 ASSESSMENT — ANXIETY QUESTIONNAIRES
3. WORRYING TOO MUCH ABOUT DIFFERENT THINGS: SEVERAL DAYS
1. FEELING NERVOUS, ANXIOUS, OR ON EDGE: NOT AT ALL
7. FEELING AFRAID AS IF SOMETHING AWFUL MIGHT HAPPEN: NOT AT ALL
4. TROUBLE RELAXING: NOT AT ALL
GAD7 TOTAL SCORE: 1
5. BEING SO RESTLESS THAT IT IS HARD TO SIT STILL: NOT AT ALL
2. NOT BEING ABLE TO STOP OR CONTROL WORRYING: NOT AT ALL
6. BECOMING EASILY ANNOYED OR IRRITABLE: NOT AT ALL
IF YOU CHECKED OFF ANY PROBLEMS ON THIS QUESTIONNAIRE, HOW DIFFICULT HAVE THESE PROBLEMS MADE IT FOR YOU TO DO YOUR WORK, TAKE CARE OF THINGS AT HOME, OR GET ALONG WITH OTHER PEOPLE: NOT DIFFICULT AT ALL

## 2025-01-24 ASSESSMENT — PATIENT HEALTH QUESTIONNAIRE - PHQ9
4. FEELING TIRED OR HAVING LITTLE ENERGY: SEVERAL DAYS
SUM OF ALL RESPONSES TO PHQ QUESTIONS 1-9: 3
6. FEELING BAD ABOUT YOURSELF - OR THAT YOU ARE A FAILURE OR HAVE LET YOURSELF OR YOUR FAMILY DOWN: NOT AT ALL
10. IF YOU CHECKED OFF ANY PROBLEMS, HOW DIFFICULT HAVE THESE PROBLEMS MADE IT FOR YOU TO DO YOUR WORK, TAKE CARE OF THINGS AT HOME, OR GET ALONG WITH OTHER PEOPLE: SOMEWHAT DIFFICULT
SUM OF ALL RESPONSES TO PHQ QUESTIONS 1-9: 3
9. THOUGHTS THAT YOU WOULD BE BETTER OFF DEAD, OR OF HURTING YOURSELF: NOT AT ALL
SUM OF ALL RESPONSES TO PHQ9 QUESTIONS 1 & 2: 0
2. FEELING DOWN, DEPRESSED OR HOPELESS: NOT AT ALL
1. LITTLE INTEREST OR PLEASURE IN DOING THINGS: NOT AT ALL
SUM OF ALL RESPONSES TO PHQ QUESTIONS 1-9: 3
SUM OF ALL RESPONSES TO PHQ QUESTIONS 1-9: 3
7. TROUBLE CONCENTRATING ON THINGS, SUCH AS READING THE NEWSPAPER OR WATCHING TELEVISION: SEVERAL DAYS
5. POOR APPETITE OR OVEREATING: NOT AT ALL
3. TROUBLE FALLING OR STAYING ASLEEP: SEVERAL DAYS

## 2025-01-24 ASSESSMENT — ENCOUNTER SYMPTOMS
SHORTNESS OF BREATH: 0
NAUSEA: 0
COUGH: 0
VOMITING: 0
ABDOMINAL PAIN: 0

## 2025-01-24 NOTE — PROGRESS NOTES
\"Have you been to the ER, urgent care clinic since your last visit?  Hospitalized since your last visit?\"    NO    “Have you seen or consulted any other health care providers outside our system since your last visit?”    NO    Have you had a mammogram?”   NO    Date of last Mammogram: 5/7/2021      “Have you had a pap smear?”    NO    Date of last Cervical Cancer screen (HPV or PAP): 2/18/2019       “Have you had a colorectal cancer screening such as a colonoscopy/FIT/Cologuard?    NO    No colonoscopy on file  No cologuard on file  No FIT/FOBT on file   No flexible sigmoidoscopy on file            
Transportation Needs (1/24/2025)    PRAPARE - Transportation     Lack of Transportation (Medical): No     Lack of Transportation (Non-Medical): No   Physical Activity: Not on file   Stress: Not on file   Social Connections: Not on file   Intimate Partner Violence: Not on file   Housing Stability: Low Risk  (1/24/2025)    Housing Stability Vital Sign     Unable to Pay for Housing in the Last Year: No     Number of Times Moved in the Last Year: 0     Homeless in the Last Year: No        Family History   Problem Relation Age of Onset    Diabetes Mother     High Blood Pressure Mother     Vision Loss Mother     Prostate Cancer Father         70s    Hearing Loss Father     Diabetes Paternal Uncle     Cancer Paternal Uncle     Prostate Cancer Paternal Uncle     Cancer Paternal Grandmother         stomach    Allergy (Severe) Sister     Asthma Sister        Physical Exam  Vitals:    01/24/25 1431   BP: 118/70   Site: Left Upper Arm   Position: Sitting   Cuff Size: Medium Adult   Pulse: 66   Temp: 97.1 °F (36.2 °C)   TempSrc: Temporal   SpO2: 99%   Weight: 88.9 kg (196 lb)   Height: 1.651 m (5' 5\")     Estimated body mass index is 32.62 kg/m² as calculated from the following:    Height as of this encounter: 1.651 m (5' 5\").    Weight as of this encounter: 88.9 kg (196 lb).    Physical Exam  Vitals reviewed.   Constitutional:       General: She is not in acute distress.     Appearance: Normal appearance.   HENT:      Head: Normocephalic and atraumatic.   Cardiovascular:      Rate and Rhythm: Normal rate and regular rhythm.   Pulmonary:      Effort: Pulmonary effort is normal. No respiratory distress.      Breath sounds: Normal breath sounds.   Abdominal:      Palpations: Abdomen is soft.      Tenderness: There is no abdominal tenderness.   Musculoskeletal:         General: No swelling. Normal range of motion.   Skin:     General: Skin is warm and dry.   Neurological:      Mental Status: She is alert.   Psychiatric:

## 2025-02-03 ENCOUNTER — PATIENT MESSAGE (OUTPATIENT)
Dept: FAMILY MEDICINE CLINIC | Age: 60
End: 2025-02-03

## 2025-02-04 ENCOUNTER — OFFICE VISIT (OUTPATIENT)
Dept: FAMILY MEDICINE CLINIC | Age: 60
End: 2025-02-04
Payer: COMMERCIAL

## 2025-02-04 VITALS
DIASTOLIC BLOOD PRESSURE: 90 MMHG | BODY MASS INDEX: 32.65 KG/M2 | SYSTOLIC BLOOD PRESSURE: 110 MMHG | WEIGHT: 196.2 LBS | HEART RATE: 80 BPM | OXYGEN SATURATION: 98 %

## 2025-02-04 DIAGNOSIS — R42 DIZZINESS: Primary | ICD-10-CM

## 2025-02-04 DIAGNOSIS — R00.2 PALPITATIONS: ICD-10-CM

## 2025-02-04 DIAGNOSIS — R42 DIZZINESS: ICD-10-CM

## 2025-02-04 DIAGNOSIS — D17.22 LIPOMA OF LEFT UPPER EXTREMITY: ICD-10-CM

## 2025-02-04 PROCEDURE — 99214 OFFICE O/P EST MOD 30 MIN: CPT | Performed by: STUDENT IN AN ORGANIZED HEALTH CARE EDUCATION/TRAINING PROGRAM

## 2025-02-04 RX ORDER — MECLIZINE HCL 12.5 MG 12.5 MG/1
12.5 TABLET ORAL 3 TIMES DAILY PRN
Qty: 30 TABLET | Refills: 0 | Status: SHIPPED | OUTPATIENT
Start: 2025-02-04

## 2025-02-04 ASSESSMENT — PATIENT HEALTH QUESTIONNAIRE - PHQ9
4. FEELING TIRED OR HAVING LITTLE ENERGY: SEVERAL DAYS
7. TROUBLE CONCENTRATING ON THINGS, SUCH AS READING THE NEWSPAPER OR WATCHING TELEVISION: NOT AT ALL
2. FEELING DOWN, DEPRESSED OR HOPELESS: NOT AT ALL
SUM OF ALL RESPONSES TO PHQ QUESTIONS 1-9: 2
1. LITTLE INTEREST OR PLEASURE IN DOING THINGS: NOT AT ALL
8. MOVING OR SPEAKING SO SLOWLY THAT OTHER PEOPLE COULD HAVE NOTICED. OR THE OPPOSITE, BEING SO FIGETY OR RESTLESS THAT YOU HAVE BEEN MOVING AROUND A LOT MORE THAN USUAL: NOT AT ALL
5. POOR APPETITE OR OVEREATING: NOT AT ALL
3. TROUBLE FALLING OR STAYING ASLEEP: SEVERAL DAYS
DEPRESSION UNABLE TO ASSESS: FUNCTIONAL CAPACITY MOTIVATION LIMITS ACCURACY
10. IF YOU CHECKED OFF ANY PROBLEMS, HOW DIFFICULT HAVE THESE PROBLEMS MADE IT FOR YOU TO DO YOUR WORK, TAKE CARE OF THINGS AT HOME, OR GET ALONG WITH OTHER PEOPLE: NOT DIFFICULT AT ALL
SUM OF ALL RESPONSES TO PHQ QUESTIONS 1-9: 2
SUM OF ALL RESPONSES TO PHQ9 QUESTIONS 1 & 2: 0
6. FEELING BAD ABOUT YOURSELF - OR THAT YOU ARE A FAILURE OR HAVE LET YOURSELF OR YOUR FAMILY DOWN: NOT AT ALL
9. THOUGHTS THAT YOU WOULD BE BETTER OFF DEAD, OR OF HURTING YOURSELF: NOT AT ALL
SUM OF ALL RESPONSES TO PHQ QUESTIONS 1-9: 2
SUM OF ALL RESPONSES TO PHQ QUESTIONS 1-9: 2

## 2025-02-04 ASSESSMENT — ANXIETY QUESTIONNAIRES
2. NOT BEING ABLE TO STOP OR CONTROL WORRYING: NOT AT ALL
6. BECOMING EASILY ANNOYED OR IRRITABLE: NOT AT ALL
7. FEELING AFRAID AS IF SOMETHING AWFUL MIGHT HAPPEN: NOT AT ALL
5. BEING SO RESTLESS THAT IT IS HARD TO SIT STILL: NOT AT ALL
GAD7 TOTAL SCORE: 1
3. WORRYING TOO MUCH ABOUT DIFFERENT THINGS: SEVERAL DAYS
IF YOU CHECKED OFF ANY PROBLEMS ON THIS QUESTIONNAIRE, HOW DIFFICULT HAVE THESE PROBLEMS MADE IT FOR YOU TO DO YOUR WORK, TAKE CARE OF THINGS AT HOME, OR GET ALONG WITH OTHER PEOPLE: NOT DIFFICULT AT ALL
1. FEELING NERVOUS, ANXIOUS, OR ON EDGE: NOT AT ALL
4. TROUBLE RELAXING: NOT AT ALL

## 2025-02-04 NOTE — PROGRESS NOTES
[facility] Medicine  2025    Rosa Crawford (:  1965) is a 59 y.o. female, here for evaluation of the following medical concerns:    Chief Complaint   Patient presents with    Dizziness     1.5 weeks, \"head pressure\" not really a headache. \"At work Thursday and went to take a drink of my coffee then felt sick to stomach, about 10-15 minutes later I felt like I was going to pass out\".    Mass     Lump between L shoulder blade and shoulder, just noticed that over weekend. Not painful, just noticeable.        ASSESSMENT/ PLAN  Assessment & Plan  Dizziness    Lightheadedness/dizziness intermittent since last week with some associated head pressure, palpitations. Unclear etiology. Orthostatics neg, neuro exam normal, neg Gary-Hallpike. Recent CTA and MRI/MRA in November. Will obtain labs, holter monitor due to palpitations and dizziness. Encouraged hydration, trial of prn meclizine to see if improvement of symptoms.     Orders:    CBC with Auto Differential; Future    Basic Metabolic Panel; Future    TSH reflex to FT4,FT3; Future    Extended cardiac holter monitor (3 days-14 day); Future    Magnesium; Future    Palpitations    Occasional palpitations with associated dizziness as above - will obtain work-up below.      Orders:    TSH reflex to FT4,FT3; Future    Extended cardiac holter monitor (3 days-14 day); Future    Magnesium; Future    Lipoma of left upper extremity    Well-circumscribed 2cm lump on posterior left arm most c/w lipoma - will monitor.             Return in about 4 weeks (around 3/4/2025) for F/u symptoms, pap smear, mammo.    HPI  Here for acute visit.     On Wednesday, drank coffee, felt nauseous, felt like going to pass out, feeling intermittently dizzy and feeling off since then, comes and goes, has some head pressure with it. Orthostatics negative, normal neuro exam. No fever/chills, some congestion. No sinus pain/pressure. Occasional heart palpitations associated

## 2025-02-05 LAB
ANION GAP SERPL CALCULATED.3IONS-SCNC: 10 MMOL/L (ref 3–16)
BASOPHILS # BLD: 0.1 K/UL (ref 0–0.2)
BASOPHILS NFR BLD: 1.3 %
BUN SERPL-MCNC: 9 MG/DL (ref 7–20)
CALCIUM SERPL-MCNC: 10 MG/DL (ref 8.3–10.6)
CHLORIDE SERPL-SCNC: 99 MMOL/L (ref 99–110)
CO2 SERPL-SCNC: 27 MMOL/L (ref 21–32)
CREAT SERPL-MCNC: 0.6 MG/DL (ref 0.6–1.1)
DEPRECATED RDW RBC AUTO: 12.9 % (ref 12.4–15.4)
EOSINOPHIL # BLD: 0.1 K/UL (ref 0–0.6)
EOSINOPHIL NFR BLD: 1.5 %
GFR SERPLBLD CREATININE-BSD FMLA CKD-EPI: >90 ML/MIN/{1.73_M2}
GLUCOSE SERPL-MCNC: 80 MG/DL (ref 70–99)
HCT VFR BLD AUTO: 38.4 % (ref 36–48)
HGB BLD-MCNC: 13.1 G/DL (ref 12–16)
LYMPHOCYTES # BLD: 2.8 K/UL (ref 1–5.1)
LYMPHOCYTES NFR BLD: 35.7 %
MAGNESIUM SERPL-MCNC: 2.21 MG/DL (ref 1.8–2.4)
MCH RBC QN AUTO: 32.5 PG (ref 26–34)
MCHC RBC AUTO-ENTMCNC: 34 G/DL (ref 31–36)
MCV RBC AUTO: 95.6 FL (ref 80–100)
MONOCYTES # BLD: 0.7 K/UL (ref 0–1.3)
MONOCYTES NFR BLD: 9.4 %
NEUTROPHILS # BLD: 4.1 K/UL (ref 1.7–7.7)
NEUTROPHILS NFR BLD: 52.1 %
PLATELET # BLD AUTO: 313 K/UL (ref 135–450)
PMV BLD AUTO: 10.1 FL (ref 5–10.5)
POTASSIUM SERPL-SCNC: 4.8 MMOL/L (ref 3.5–5.1)
RBC # BLD AUTO: 4.02 M/UL (ref 4–5.2)
SODIUM SERPL-SCNC: 136 MMOL/L (ref 136–145)
TSH SERPL DL<=0.005 MIU/L-ACNC: 1.2 UIU/ML (ref 0.27–4.2)
WBC # BLD AUTO: 7.9 K/UL (ref 4–11)

## 2025-02-05 ASSESSMENT — ENCOUNTER SYMPTOMS
VOMITING: 0
NAUSEA: 0
ABDOMINAL PAIN: 0
COUGH: 0
SHORTNESS OF BREATH: 0

## 2025-02-11 ENCOUNTER — ANCILLARY PROCEDURE (OUTPATIENT)
Dept: CARDIOLOGY CLINIC | Age: 60
End: 2025-02-11

## 2025-02-11 ENCOUNTER — TELEPHONE (OUTPATIENT)
Dept: CARDIOLOGY CLINIC | Age: 60
End: 2025-02-11

## 2025-02-11 DIAGNOSIS — R42 DIZZINESS: ICD-10-CM

## 2025-02-11 DIAGNOSIS — R00.2 PALPITATIONS: ICD-10-CM

## 2025-02-11 NOTE — TELEPHONE ENCOUNTER
Monitor placed by al  Monitor company [employer]  Length of monitor 7 day  Monitor ordered by   Serial number [id number]  Patch ID [id number]  Activation successful prior to pt leaving office? Yes

## 2025-02-14 LAB — NONINV COLON CA DNA+OCC BLD SCRN STL QL: NEGATIVE

## 2025-04-25 DIAGNOSIS — E03.9 ACQUIRED HYPOTHYROIDISM: ICD-10-CM

## 2025-04-25 RX ORDER — LEVOTHYROXINE SODIUM 150 UG/1
150 TABLET ORAL DAILY
Qty: 90 TABLET | Refills: 1 | Status: SHIPPED | OUTPATIENT
Start: 2025-04-25

## 2025-04-25 NOTE — TELEPHONE ENCOUNTER
Refill Request     CONFIRM preferred pharmacy with the patient.    If Mail Order Rx - Pend for 90 day refill.      Last Seen: Last Seen Department: 2/4/2025  Last Seen by PCP: 2/4/2025    Last Written: 10/24/24 90 with 1 refill     If no future appointment scheduled:  Review the last OV with PCP and review information for follow-up visit,  Route STAFF MESSAGE with patient name to the  Pool for scheduling with the following information:            -  Timing of next visit           -  Visit type ie Physical, OV, etc           -  Diagnoses/Reason ie. COPD, HTN - Do not use MEDICATION, Follow-up or CHECK UP - Give reason for visit      Next Appointment:   Future Appointments   Date Time Provider Department Center   5/23/2025  9:30 AM Regina Julian DO [facility] OB[facility]   5/23/2025  9:45 AM HALINA [facility] OB[facility]  RM 1 [facility]       Message sent to  to schedule appt with patient?  YESReturn in about 4 weeks (around 2/21/2025) for Pap smear + mammo .       Requested Prescriptions     Pending Prescriptions Disp Refills    levothyroxine (SYNTHROID) 150 MCG tablet [Pharmacy Med Name: LEVOTHYROXINE 150 MCG TABLET] 90 tablet 1     Sig: TAKE 1 TABLET BY MOUTH EVERY DAY

## 2025-05-23 ENCOUNTER — OFFICE VISIT (OUTPATIENT)
Dept: OBGYN CLINIC | Age: 60
End: 2025-05-23

## 2025-05-23 VITALS
HEART RATE: 103 BPM | SYSTOLIC BLOOD PRESSURE: 125 MMHG | DIASTOLIC BLOOD PRESSURE: 83 MMHG | BODY MASS INDEX: 31.65 KG/M2 | WEIGHT: 190 LBS | HEIGHT: 65 IN | OXYGEN SATURATION: 98 %

## 2025-05-23 DIAGNOSIS — Z12.4 SCREENING FOR CERVICAL CANCER: ICD-10-CM

## 2025-05-23 DIAGNOSIS — Z01.419 WOMEN'S ANNUAL ROUTINE GYNECOLOGICAL EXAMINATION: Primary | ICD-10-CM

## 2025-05-23 DIAGNOSIS — N95.0 POSTMENOPAUSAL BLEEDING: ICD-10-CM

## 2025-05-23 ASSESSMENT — ENCOUNTER SYMPTOMS
BLOOD IN STOOL: 0
CONSTIPATION: 0
DIARRHEA: 0
BACK PAIN: 0

## 2025-05-23 NOTE — PROGRESS NOTES
[facility]   Annual Exam      CC:   Chief Complaint   Patient presents with    New Patient     New Patient: PMB Last Pap 19       HPI:  59 y.o.  presents for her gynecologic annual exam. She reports that she had an episode of pink discharge approximately one month ago. She reports that her last menstrual cycle was  or . She denies being on hormones. She denies any supplements. She reports her thyroid levels have been normal.     Primary Care Physician: Christie Villegas MD    Obstetric History  OB History    Para Term  AB Living   3 3 3   3   SAB IAB Ectopic Molar Multiple Live Births        3      # Outcome Date GA Lbr Fredy/2nd Weight Sex Type Anes PTL Lv   3 Term      Vag-Spont   DENISSE   2 Term      Vag-Spont   DENISSE   1 Term      Vag-Spont   DENISSE       Gynecologic History  Menstrual History:  Menarche: 14   LMP: age 51  Menstrual Period: regular  Interval Between Menses: monthly   Duration of Menses: five to seven days   Menstrual Flow: heavy for the first couple days   Bleeding between menses: denies   Pain: denies    Post Menopausal Bleeding: yes     Screening:  Last pap smear:  KARLA with negative HPV  History of abnormal pap smears: denies   STI hx: denies   Mammogram: 10 years ago   Colonoscopy: Cooguard     Medical History:  Past Medical History:   Diagnosis Date    Allergic rhinitis     Anxiety     can't sleep    Arthritis     Asthma     Chronic back pain     Dental disease     Dizziness     Fracture of nasal bone     GERD (gastroesophageal reflux disease)     Hearing loss     Hypothyroidism 2004?    Osteoarthritis 2015    Tinnitus     TMJ dysfunction        Surgical History:  Past Surgical History:   Procedure Laterality Date    ANKLE SURGERY  2016    CHOLECYSTECTOMY, LAPAROSCOPIC  2008    FRACTURE SURGERY  2016    MANDIBLE SURGERY      underbite    PELVIC LAPAROSCOPY      to remove fibroid    THYROIDECTOMY, PARTIAL Right     hashimoto's on path, nodular to

## 2025-05-30 ENCOUNTER — OFFICE VISIT (OUTPATIENT)
Dept: FAMILY MEDICINE CLINIC | Age: 60
End: 2025-05-30
Payer: COMMERCIAL

## 2025-05-30 VITALS
HEART RATE: 87 BPM | DIASTOLIC BLOOD PRESSURE: 82 MMHG | WEIGHT: 198.2 LBS | OXYGEN SATURATION: 96 % | HEIGHT: 65 IN | SYSTOLIC BLOOD PRESSURE: 126 MMHG | BODY MASS INDEX: 33.02 KG/M2 | TEMPERATURE: 98.1 F

## 2025-05-30 DIAGNOSIS — Z01.818 PRE-OP EVALUATION: Primary | ICD-10-CM

## 2025-05-30 LAB
HPV HR 12 DNA SPEC QL NAA+PROBE: NOT DETECTED
HPV16 DNA SPEC QL NAA+PROBE: NOT DETECTED
HPV16+18+H RISK 12 DNA SPEC-IMP: NORMAL
HPV18 DNA SPEC QL NAA+PROBE: NOT DETECTED

## 2025-05-30 PROCEDURE — 99213 OFFICE O/P EST LOW 20 MIN: CPT

## 2025-05-30 PROCEDURE — 93000 ELECTROCARDIOGRAM COMPLETE: CPT

## 2025-05-30 SDOH — ECONOMIC STABILITY: FOOD INSECURITY: WITHIN THE PAST 12 MONTHS, THE FOOD YOU BOUGHT JUST DIDN'T LAST AND YOU DIDN'T HAVE MONEY TO GET MORE.: NEVER TRUE

## 2025-05-30 SDOH — ECONOMIC STABILITY: FOOD INSECURITY: WITHIN THE PAST 12 MONTHS, YOU WORRIED THAT YOUR FOOD WOULD RUN OUT BEFORE YOU GOT MONEY TO BUY MORE.: NEVER TRUE

## 2025-05-30 ASSESSMENT — PATIENT HEALTH QUESTIONNAIRE - PHQ9
2. FEELING DOWN, DEPRESSED OR HOPELESS: NOT AT ALL
SUM OF ALL RESPONSES TO PHQ QUESTIONS 1-9: 0
1. LITTLE INTEREST OR PLEASURE IN DOING THINGS: NOT AT ALL
SUM OF ALL RESPONSES TO PHQ QUESTIONS 1-9: 0

## 2025-05-30 NOTE — PROGRESS NOTES
Preoperative Consultation      Rosa Crawford  YOB: 1965    Date of Service:  5/30/2025    There were no vitals filed for this visit.   Wt Readings from Last 2 Encounters:   05/23/25 86.2 kg (190 lb)   02/04/25 89 kg (196 lb 3.2 oz)     BP Readings from Last 3 Encounters:   05/23/25 125/83   02/04/25 (!) 110/90   01/24/25 118/70        No chief complaint on file.    Allergies   Allergen Reactions    Dust Mite Extract     Environmental/Seasonal      No outpatient medications have been marked as taking for the 5/30/25 encounter (Appointment) with Alee Stein PA-C.       This patient presents to the office today for a preoperative consultation at the request of surgeon,  , who plans on performing Left Knee Surgery on June 13 at [facility].  The current problem began 6 months ago, and symptoms have been unchanged with time.  Conservative therapy: Yes: exercises, injections, brace use, which has been ineffective..    Planned anesthesia: General   Known anesthesia problems: None   Bleeding risk: No recent or remote history of abnormal bleeding  Personal or FH of DVT/PE: No    Patient objection to receiving blood products: No    Patient Active Problem List   Diagnosis    Anxiety    History of histoplasmosis    Acquired hypothyroidism    Chondromalacia    Gonzales cyst, right    Primary osteoarthritis of right knee    Globus sensation    Gastroesophageal reflux disease with esophagitis    Tinnitus aurium, bilateral    Dry cough    Cervical strain, acute, initial encounter    Osteoarthritis of cervical spine    Oropharyngeal dysphagia    S/P partial thyroidectomy    TIA (transient ischemic attack)       Past Medical History:   Diagnosis Date    Allergic rhinitis     Anxiety     can't sleep    Arthritis     Asthma     Chronic back pain 2000    Dental disease     Dizziness     Fracture of nasal bone     GERD (gastroesophageal reflux disease)     Hearing loss     Hypothyroidism 2004?

## 2025-05-30 NOTE — PATIENT INSTRUCTIONS
Stop taking ibuprofen or alleve (any nsaid products) 7 days prior to surgery.    Stop taking all vitamins 7 days prior to surgery.

## 2025-05-30 NOTE — PROGRESS NOTES
Have you been to the ER, urgent care clinic since your last visit?  Hospitalized since your last visit?   NO    Have you seen or consulted any other health care providers outside our system since your last visit?   YES - When: approximately 10 days ago.  Where and Why: [facility] for fracture of medial femoral condyle .    Have you had a mammogram?”   NO    Date of last Mammogram: 5/7/2021

## 2025-06-03 ENCOUNTER — TELEPHONE (OUTPATIENT)
Dept: FAMILY MEDICINE CLINIC | Age: 60
End: 2025-06-03

## 2025-06-03 ENCOUNTER — PATIENT MESSAGE (OUTPATIENT)
Dept: FAMILY MEDICINE CLINIC | Age: 60
End: 2025-06-03

## 2025-06-03 DIAGNOSIS — Z01.818 PRE-OP EVALUATION: Primary | ICD-10-CM

## 2025-06-03 NOTE — TELEPHONE ENCOUNTER
Will you please contact Dr. Alvarez's office and/or Rosa to see if she has the labs needed yet for her surgery? I don't see the preop lab request we sent last Friday?

## 2025-06-03 NOTE — TELEPHONE ENCOUNTER
Called Dr. Alvarez's office with [facility] on Friday 5/30 requesting pre op paperwork for the pts upcoming surgery. Spoke with  and they stated they would send a message to Dr. Alvarez and fax over pre op paperwork. Provided phone number and fax number for [facility]     Called again 6/3 requesting the same as above after not receiving over the weekend and spoke with Sada, who again told me she would send a message to Dr. Alvarez and fax the necessary information that we are requesting. Provided office phone and fax number for M    Pt stated she didn't have pre op paperwork with her at the time of the visit

## 2025-06-05 NOTE — TELEPHONE ENCOUNTER
Patient called the office, states her paperwork states that the following labs need to be done:     CBC with diff  UA complete w/ micro w/ reflex  PT/INR  PTT  CMP  A1C     Orders pending.

## 2025-06-06 DIAGNOSIS — Z01.818 PRE-OP EVALUATION: ICD-10-CM

## 2025-06-06 LAB
ALBUMIN SERPL-MCNC: 4.4 G/DL (ref 3.4–5)
ALBUMIN/GLOB SERPL: 1.8 {RATIO} (ref 1.1–2.2)
ALP SERPL-CCNC: 61 U/L (ref 40–129)
ALT SERPL-CCNC: 21 U/L (ref 10–40)
ANION GAP SERPL CALCULATED.3IONS-SCNC: 11 MMOL/L (ref 3–16)
APTT BLD: 29.7 SEC (ref 22.1–36.4)
AST SERPL-CCNC: 21 U/L (ref 15–37)
BACTERIA URNS QL MICRO: NORMAL /HPF
BASOPHILS # BLD: 0 K/UL (ref 0–0.2)
BASOPHILS NFR BLD: 0.6 %
BILIRUB SERPL-MCNC: 0.6 MG/DL (ref 0–1)
BILIRUB UR QL STRIP.AUTO: NEGATIVE
BILIRUB UR QL STRIP.AUTO: NEGATIVE
BUN SERPL-MCNC: 10 MG/DL (ref 7–20)
CALCIUM SERPL-MCNC: 9.9 MG/DL (ref 8.3–10.6)
CHLORIDE SERPL-SCNC: 102 MMOL/L (ref 99–110)
CLARITY UR: CLEAR
CLARITY UR: CLEAR
CO2 SERPL-SCNC: 25 MMOL/L (ref 21–32)
COLOR UR: YELLOW
COLOR UR: YELLOW
CREAT SERPL-MCNC: 0.5 MG/DL (ref 0.6–1.1)
DEPRECATED RDW RBC AUTO: 13.5 % (ref 12.4–15.4)
EOSINOPHIL # BLD: 0.1 K/UL (ref 0–0.6)
EOSINOPHIL NFR BLD: 1.6 %
EPI CELLS #/AREA URNS AUTO: 2 /HPF (ref 0–5)
EPI CELLS #/AREA URNS AUTO: 2 /HPF (ref 0–5)
GFR SERPLBLD CREATININE-BSD FMLA CKD-EPI: >90 ML/MIN/{1.73_M2}
GLUCOSE SERPL-MCNC: 101 MG/DL (ref 70–99)
GLUCOSE UR STRIP.AUTO-MCNC: NEGATIVE MG/DL
GLUCOSE UR STRIP.AUTO-MCNC: NEGATIVE MG/DL
HCT VFR BLD AUTO: 36.9 % (ref 36–48)
HGB BLD-MCNC: 12.6 G/DL (ref 12–16)
HGB UR QL STRIP.AUTO: NEGATIVE
HGB UR QL STRIP.AUTO: NEGATIVE
HYALINE CASTS #/AREA URNS AUTO: 0 /LPF (ref 0–8)
HYALINE CASTS #/AREA URNS AUTO: 0 /LPF (ref 0–8)
INR PPP: 0.92 (ref 0.85–1.15)
KETONES UR STRIP.AUTO-MCNC: NEGATIVE MG/DL
KETONES UR STRIP.AUTO-MCNC: NEGATIVE MG/DL
LEUKOCYTE ESTERASE UR QL STRIP.AUTO: ABNORMAL
LEUKOCYTE ESTERASE UR QL STRIP.AUTO: ABNORMAL
LYMPHOCYTES # BLD: 1.9 K/UL (ref 1–5.1)
LYMPHOCYTES NFR BLD: 30.6 %
MCH RBC QN AUTO: 31 PG (ref 26–34)
MCHC RBC AUTO-ENTMCNC: 34.1 G/DL (ref 31–36)
MCV RBC AUTO: 90.9 FL (ref 80–100)
MONOCYTES # BLD: 0.6 K/UL (ref 0–1.3)
MONOCYTES NFR BLD: 9.1 %
NEUTROPHILS # BLD: 3.6 K/UL (ref 1.7–7.7)
NEUTROPHILS NFR BLD: 58.1 %
NITRITE UR QL STRIP.AUTO: NEGATIVE
NITRITE UR QL STRIP.AUTO: NEGATIVE
PH UR STRIP.AUTO: 6.5 [PH] (ref 5–8)
PH UR STRIP.AUTO: 6.5 [PH] (ref 5–8)
PLATELET # BLD AUTO: 262 K/UL (ref 135–450)
PMV BLD AUTO: 10.4 FL (ref 5–10.5)
POTASSIUM SERPL-SCNC: 4.5 MMOL/L (ref 3.5–5.1)
PROT SERPL-MCNC: 6.9 G/DL (ref 6.4–8.2)
PROT UR STRIP.AUTO-MCNC: NEGATIVE MG/DL
PROT UR STRIP.AUTO-MCNC: NEGATIVE MG/DL
PROTHROMBIN TIME: 12.5 SEC (ref 11.9–14.9)
RBC # BLD AUTO: 4.06 M/UL (ref 4–5.2)
RBC CLUMPS #/AREA URNS AUTO: 1 /HPF (ref 0–4)
RBC CLUMPS #/AREA URNS AUTO: 2 /HPF (ref 0–4)
SODIUM SERPL-SCNC: 138 MMOL/L (ref 136–145)
SP GR UR STRIP.AUTO: 1.02 (ref 1–1.03)
SP GR UR STRIP.AUTO: 1.02 (ref 1–1.03)
UA COMPLETE W REFLEX CULTURE PNL UR: ABNORMAL
UA DIPSTICK W REFLEX MICRO PNL UR: YES
UA DIPSTICK W REFLEX MICRO PNL UR: YES
URN SPEC COLLECT METH UR: ABNORMAL
URN SPEC COLLECT METH UR: ABNORMAL
UROBILINOGEN UR STRIP-ACNC: 0.2 E.U./DL
UROBILINOGEN UR STRIP-ACNC: 0.2 E.U./DL
WBC # BLD AUTO: 6.3 K/UL (ref 4–11)
WBC #/AREA URNS AUTO: 3 /HPF (ref 0–5)
WBC #/AREA URNS AUTO: 4 /HPF (ref 0–5)

## 2025-06-07 LAB
EST. AVERAGE GLUCOSE BLD GHB EST-MCNC: 111.2 MG/DL
HBA1C MFR BLD: 5.5 %

## 2025-06-09 ENCOUNTER — RESULTS FOLLOW-UP (OUTPATIENT)
Dept: FAMILY MEDICINE CLINIC | Age: 60
End: 2025-06-09

## 2025-06-22 ENCOUNTER — HOSPITAL ENCOUNTER (EMERGENCY)
Age: 60
Discharge: HOME | End: 2025-06-22
Attending: EMERGENCY MEDICINE
Payer: COMMERCIAL

## 2025-06-22 ENCOUNTER — APPOINTMENT (OUTPATIENT)
Dept: CT IMAGING | Age: 60
End: 2025-06-22
Payer: COMMERCIAL

## 2025-06-22 VITALS
RESPIRATION RATE: 18 BRPM | OXYGEN SATURATION: 100 % | TEMPERATURE: 98.6 F | BODY MASS INDEX: 32.68 KG/M2 | WEIGHT: 196.4 LBS | DIASTOLIC BLOOD PRESSURE: 87 MMHG | SYSTOLIC BLOOD PRESSURE: 126 MMHG | HEART RATE: 76 BPM

## 2025-06-22 DIAGNOSIS — R68.89 COLD SWEAT: ICD-10-CM

## 2025-06-22 DIAGNOSIS — R06.02 SHORTNESS OF BREATH: ICD-10-CM

## 2025-06-22 DIAGNOSIS — M71.22 BAKER'S CYST OF KNEE, LEFT: ICD-10-CM

## 2025-06-22 DIAGNOSIS — I77.810 ECTATIC THORACIC AORTA: ICD-10-CM

## 2025-06-22 DIAGNOSIS — R20.0 NUMBNESS AND TINGLING OF LEFT LEG: ICD-10-CM

## 2025-06-22 DIAGNOSIS — R20.2 NUMBNESS AND TINGLING OF LEFT LEG: ICD-10-CM

## 2025-06-22 DIAGNOSIS — R60.0 LEG EDEMA, LEFT: Primary | ICD-10-CM

## 2025-06-22 LAB
ALBUMIN SERPL-MCNC: 4.4 G/DL (ref 3.4–5)
ALBUMIN/GLOB SERPL: 1.5 {RATIO} (ref 1.1–2.2)
ALP SERPL-CCNC: 72 U/L (ref 40–129)
ALT SERPL-CCNC: 25 U/L (ref 10–40)
ANION GAP SERPL CALCULATED.3IONS-SCNC: 9 MMOL/L (ref 3–16)
AST SERPL-CCNC: 22 U/L (ref 15–37)
BASOPHILS # BLD: 0 K/UL (ref 0–0.2)
BASOPHILS NFR BLD: 0.4 %
BILIRUB SERPL-MCNC: 0.5 MG/DL (ref 0–1)
BUN SERPL-MCNC: 10 MG/DL (ref 7–20)
CALCIUM SERPL-MCNC: 9.9 MG/DL (ref 8.3–10.6)
CHLORIDE SERPL-SCNC: 101 MMOL/L (ref 99–110)
CO2 SERPL-SCNC: 27 MMOL/L (ref 21–32)
CREAT SERPL-MCNC: 0.5 MG/DL (ref 0.6–1.2)
DEPRECATED RDW RBC AUTO: 13.6 % (ref 12.4–15.4)
EKG ATRIAL RATE: 76 BPM
EKG DIAGNOSIS: NORMAL
EKG P AXIS: 31 DEGREES
EKG P-R INTERVAL: 158 MS
EKG Q-T INTERVAL: 390 MS
EKG QRS DURATION: 100 MS
EKG QTC CALCULATION (BAZETT): 438 MS
EKG R AXIS: -13 DEGREES
EKG T AXIS: 21 DEGREES
EKG VENTRICULAR RATE: 76 BPM
EOSINOPHIL # BLD: 0.2 K/UL (ref 0–0.6)
EOSINOPHIL NFR BLD: 2.8 %
GFR SERPLBLD CREATININE-BSD FMLA CKD-EPI: >90 ML/MIN/{1.73_M2}
GLUCOSE SERPL-MCNC: 117 MG/DL (ref 70–99)
HCT VFR BLD AUTO: 31.6 % (ref 36–48)
HGB BLD-MCNC: 10.8 G/DL (ref 12–16)
LACTATE BLDV-SCNC: 0.7 MMOL/L (ref 0.4–2)
LYMPHOCYTES # BLD: 1.7 K/UL (ref 1–5.1)
LYMPHOCYTES NFR BLD: 22.4 %
MCH RBC QN AUTO: 31.3 PG (ref 26–34)
MCHC RBC AUTO-ENTMCNC: 34 G/DL (ref 31–36)
MCV RBC AUTO: 92 FL (ref 80–100)
MONOCYTES # BLD: 0.8 K/UL (ref 0–1.3)
MONOCYTES NFR BLD: 10.2 %
NEUTROPHILS # BLD: 5 K/UL (ref 1.7–7.7)
NEUTROPHILS NFR BLD: 64.2 %
NT-PROBNP SERPL-MCNC: 38 PG/ML (ref 0–124)
PLATELET # BLD AUTO: 413 K/UL (ref 135–450)
PMV BLD AUTO: 7.2 FL (ref 5–10.5)
POTASSIUM SERPL-SCNC: 4.1 MMOL/L (ref 3.5–5.1)
PROT SERPL-MCNC: 7.3 G/DL (ref 6.4–8.2)
RBC # BLD AUTO: 3.44 M/UL (ref 4–5.2)
SODIUM SERPL-SCNC: 137 MMOL/L (ref 136–145)
TROPONIN, HIGH SENSITIVITY: <6 NG/L (ref 0–14)
WBC # BLD AUTO: 7.8 K/UL (ref 4–11)

## 2025-06-22 PROCEDURE — 71260 CT THORAX DX C+: CPT

## 2025-06-22 PROCEDURE — 6360000004 HC RX CONTRAST MEDICATION: Performed by: EMERGENCY MEDICINE

## 2025-06-22 PROCEDURE — 96375 TX/PRO/DX INJ NEW DRUG ADDON: CPT

## 2025-06-22 PROCEDURE — 84484 ASSAY OF TROPONIN QUANT: CPT

## 2025-06-22 PROCEDURE — 80053 COMPREHEN METABOLIC PANEL: CPT

## 2025-06-22 PROCEDURE — 83880 ASSAY OF NATRIURETIC PEPTIDE: CPT

## 2025-06-22 PROCEDURE — 99285 EMERGENCY DEPT VISIT HI MDM: CPT

## 2025-06-22 PROCEDURE — 93005 ELECTROCARDIOGRAM TRACING: CPT | Performed by: EMERGENCY MEDICINE

## 2025-06-22 PROCEDURE — 6360000002 HC RX W HCPCS: Performed by: EMERGENCY MEDICINE

## 2025-06-22 PROCEDURE — 93010 ELECTROCARDIOGRAM REPORT: CPT | Performed by: INTERNAL MEDICINE

## 2025-06-22 PROCEDURE — 85025 COMPLETE CBC W/AUTO DIFF WBC: CPT

## 2025-06-22 PROCEDURE — 83605 ASSAY OF LACTIC ACID: CPT

## 2025-06-22 PROCEDURE — 6370000000 HC RX 637 (ALT 250 FOR IP): Performed by: EMERGENCY MEDICINE

## 2025-06-22 PROCEDURE — 96374 THER/PROPH/DIAG INJ IV PUSH: CPT

## 2025-06-22 RX ORDER — MORPHINE SULFATE 4 MG/ML
4 INJECTION, SOLUTION INTRAMUSCULAR; INTRAVENOUS ONCE
Refills: 0 | Status: COMPLETED | OUTPATIENT
Start: 2025-06-22 | End: 2025-06-22

## 2025-06-22 RX ORDER — ONDANSETRON 2 MG/ML
4 INJECTION INTRAMUSCULAR; INTRAVENOUS ONCE
Status: COMPLETED | OUTPATIENT
Start: 2025-06-22 | End: 2025-06-22

## 2025-06-22 RX ORDER — IOPAMIDOL 755 MG/ML
75 INJECTION, SOLUTION INTRAVASCULAR
Status: COMPLETED | OUTPATIENT
Start: 2025-06-22 | End: 2025-06-22

## 2025-06-22 RX ORDER — CELECOXIB 200 MG/1
200 CAPSULE ORAL 2 TIMES DAILY
COMMUNITY
Start: 2025-06-09 | End: 2025-07-09

## 2025-06-22 RX ORDER — ASPIRIN 81 MG/1
81 TABLET ORAL 2 TIMES DAILY
COMMUNITY
Start: 2025-06-09

## 2025-06-22 RX ADMIN — APIXABAN 10 MG: 5 TABLET, FILM COATED ORAL at 14:21

## 2025-06-22 RX ADMIN — ONDANSETRON 4 MG: 2 INJECTION, SOLUTION INTRAMUSCULAR; INTRAVENOUS at 10:17

## 2025-06-22 RX ADMIN — MORPHINE SULFATE 4 MG: 4 INJECTION, SOLUTION INTRAMUSCULAR; INTRAVENOUS at 10:18

## 2025-06-22 RX ADMIN — IOPAMIDOL 75 ML: 755 INJECTION, SOLUTION INTRAVENOUS at 11:11

## 2025-06-22 ASSESSMENT — PAIN - FUNCTIONAL ASSESSMENT
PAIN_FUNCTIONAL_ASSESSMENT: 0-10
PAIN_FUNCTIONAL_ASSESSMENT: ACTIVITIES ARE NOT PREVENTED

## 2025-06-22 ASSESSMENT — PAIN DESCRIPTION - LOCATION
LOCATION: LEG
LOCATION: LEG

## 2025-06-22 ASSESSMENT — PAIN SCALES - GENERAL
PAINLEVEL_OUTOF10: 7
PAINLEVEL_OUTOF10: 4

## 2025-06-22 ASSESSMENT — PAIN DESCRIPTION - ORIENTATION
ORIENTATION: LEFT
ORIENTATION: LEFT

## 2025-06-22 ASSESSMENT — PAIN DESCRIPTION - PAIN TYPE: TYPE: ACUTE PAIN

## 2025-06-22 ASSESSMENT — PAIN DESCRIPTION - DESCRIPTORS: DESCRIPTORS: DISCOMFORT

## 2025-06-22 NOTE — DISCHARGE INSTRUCTIONS
Take Eliquis as prescribed until getting your ultrasound tomorrow.  If  ultrasound is positive for a blood clot, you will need to take this for a longer period of time.  If it is negative for a blood clot, you can stop taking it.    Call your orthopedic specialist for follow-up over the next 1 to 2 days as well to ensure no early infection.    If you develop any worsening leg pain with new numbness or weakness in the foot, inability to walk, development of chest pain with shortness of breath, or any other concerns over the next 6 to 12 hours, then return to the emergency department immediate for further evaluation.    Be very careful when on Eliquis since it is a blood thinner.  If you have any falls or trauma related to this, come to the ED for further evaluation.

## 2025-06-22 NOTE — ED NOTES
@1218 called ousmane manley (Dr. Alvarez)  Per: Lorenzo Cabral MD  Re:  sonia, post op concern  @5004 [facility] called back and spoke with sonia

## 2025-06-23 ENCOUNTER — RESULTS FOLLOW-UP (OUTPATIENT)
Dept: EMERGENCY DEPT | Age: 60
End: 2025-06-23

## 2025-06-23 ENCOUNTER — TELEPHONE (OUTPATIENT)
Dept: FAMILY MEDICINE CLINIC | Age: 60
End: 2025-06-23

## 2025-06-23 ENCOUNTER — HOSPITAL ENCOUNTER (OUTPATIENT)
Age: 60
Discharge: HOME | End: 2025-06-25
Attending: EMERGENCY MEDICINE
Payer: COMMERCIAL

## 2025-06-23 DIAGNOSIS — R60.0 LEG EDEMA, LEFT: ICD-10-CM

## 2025-06-23 PROBLEM — M71.22 BAKER'S CYST OF KNEE, LEFT: Status: ACTIVE | Noted: 2025-06-23

## 2025-06-23 PROBLEM — I77.810 ECTATIC THORACIC AORTA: Status: ACTIVE | Noted: 2025-06-23

## 2025-06-23 PROCEDURE — 93971 EXTREMITY STUDY: CPT

## 2025-06-23 PROCEDURE — 93971 EXTREMITY STUDY: CPT | Performed by: SURGERY

## 2025-06-23 NOTE — TELEPHONE ENCOUNTER
ED Follow Up Call/ Schedule appt   ED: [facility]  Reason: left leg edema  Date:6/22/2025    Appt scheduled:       Comments:   Spoke with patient she stated that she is doing better she is getting imaging completed today and will then follow up with Dr. Ackerman will call if anything is needed    Future Appointments   Date Time Provider Department Center   6/23/2025  1:00 PM [facility] 1 [facility]

## 2025-07-23 ENCOUNTER — TELEPHONE (OUTPATIENT)
Dept: FAMILY MEDICINE CLINIC | Age: 60
End: 2025-07-23

## 2025-07-23 NOTE — TELEPHONE ENCOUNTER
Spoke with patient she will call our office back to schedule this at a later time    ALBERT DIGITAL SCREEN W OR WO CAD BILATERAL [[id number]] (Order [id number])    Please help patient to schedule

## 2025-08-27 ENCOUNTER — OFFICE VISIT (OUTPATIENT)
Dept: FAMILY MEDICINE CLINIC | Age: 60
End: 2025-08-27
Payer: COMMERCIAL

## 2025-08-27 VITALS
SYSTOLIC BLOOD PRESSURE: 130 MMHG | BODY MASS INDEX: 31.07 KG/M2 | WEIGHT: 186.5 LBS | TEMPERATURE: 97.1 F | HEIGHT: 65 IN | OXYGEN SATURATION: 98 % | DIASTOLIC BLOOD PRESSURE: 88 MMHG | HEART RATE: 80 BPM

## 2025-08-27 DIAGNOSIS — R10.9 ABDOMINAL PAIN, UNSPECIFIED ABDOMINAL LOCATION: Primary | ICD-10-CM

## 2025-08-27 DIAGNOSIS — N93.9 VAGINAL BLEEDING, ABNORMAL: ICD-10-CM

## 2025-08-27 DIAGNOSIS — E03.9 ACQUIRED HYPOTHYROIDISM: ICD-10-CM

## 2025-08-27 DIAGNOSIS — D64.9 ANEMIA, UNSPECIFIED TYPE: ICD-10-CM

## 2025-08-27 LAB
ALBUMIN SERPL-MCNC: 4.6 G/DL (ref 3.4–5)
ALBUMIN/GLOB SERPL: 1.6 {RATIO} (ref 1.1–2.2)
ALP SERPL-CCNC: 80 U/L (ref 40–129)
ALT SERPL-CCNC: 17 U/L (ref 10–40)
ANION GAP SERPL CALCULATED.3IONS-SCNC: 12 MMOL/L (ref 3–16)
AST SERPL-CCNC: 18 U/L (ref 15–37)
BASOPHILS # BLD: 0 K/UL (ref 0–0.2)
BASOPHILS NFR BLD: 0.8 %
BILIRUB SERPL-MCNC: 0.5 MG/DL (ref 0–1)
BILIRUBIN, POC: NEGATIVE
BLOOD URINE, POC: NEGATIVE
BUN SERPL-MCNC: 14 MG/DL (ref 7–20)
CALCIUM SERPL-MCNC: 10.4 MG/DL (ref 8.3–10.6)
CHLORIDE SERPL-SCNC: 99 MMOL/L (ref 99–110)
CLARITY, POC: CLEAR
CO2 SERPL-SCNC: 26 MMOL/L (ref 21–32)
COLOR, POC: YELLOW
CREAT SERPL-MCNC: 0.6 MG/DL (ref 0.6–1.2)
DEPRECATED RDW RBC AUTO: 13.4 % (ref 12.4–15.4)
EOSINOPHIL # BLD: 0.7 K/UL (ref 0–0.6)
EOSINOPHIL NFR BLD: 10.9 %
GFR SERPLBLD CREATININE-BSD FMLA CKD-EPI: >90 ML/MIN/{1.73_M2}
GLUCOSE SERPL-MCNC: 96 MG/DL (ref 70–99)
GLUCOSE URINE, POC: NEGATIVE MG/DL
HCT VFR BLD AUTO: 38.7 % (ref 36–48)
HGB BLD-MCNC: 12.9 G/DL (ref 12–16)
IRON SATN MFR SERPL: 24 % (ref 15–50)
IRON SERPL-MCNC: 83 UG/DL (ref 37–145)
KETONES, POC: NEGATIVE MG/DL
LEUKOCYTE EST, POC: NEGATIVE
LYMPHOCYTES # BLD: 2.1 K/UL (ref 1–5.1)
LYMPHOCYTES NFR BLD: 33.1 %
MCH RBC QN AUTO: 29.7 PG (ref 26–34)
MCHC RBC AUTO-ENTMCNC: 33.3 G/DL (ref 31–36)
MCV RBC AUTO: 89.2 FL (ref 80–100)
MONOCYTES # BLD: 0.5 K/UL (ref 0–1.3)
MONOCYTES NFR BLD: 7.7 %
NEUTROPHILS # BLD: 3 K/UL (ref 1.7–7.7)
NEUTROPHILS NFR BLD: 47.5 %
NITRITE, POC: NEGATIVE
PH, POC: 6
PLATELET # BLD AUTO: 278 K/UL (ref 135–450)
PMV BLD AUTO: 10.8 FL (ref 5–10.5)
POTASSIUM SERPL-SCNC: 4.5 MMOL/L (ref 3.5–5.1)
PROT SERPL-MCNC: 7.5 G/DL (ref 6.4–8.2)
PROTEIN, POC: NEGATIVE MG/DL
RBC # BLD AUTO: 4.34 M/UL (ref 4–5.2)
SODIUM SERPL-SCNC: 137 MMOL/L (ref 136–145)
SPECIFIC GRAVITY, POC: 1.01
TIBC SERPL-MCNC: 345 UG/DL (ref 260–445)
TSH SERPL DL<=0.005 MIU/L-ACNC: 1.43 UIU/ML (ref 0.27–4.2)
UROBILINOGEN, POC: 0.2 MG/DL
WBC # BLD AUTO: 6.3 K/UL (ref 4–11)

## 2025-08-27 PROCEDURE — 99214 OFFICE O/P EST MOD 30 MIN: CPT

## 2025-08-27 PROCEDURE — 81002 URINALYSIS NONAUTO W/O SCOPE: CPT

## 2025-08-27 SDOH — ECONOMIC STABILITY: FOOD INSECURITY: WITHIN THE PAST 12 MONTHS, THE FOOD YOU BOUGHT JUST DIDN'T LAST AND YOU DIDN'T HAVE MONEY TO GET MORE.: NEVER TRUE

## 2025-08-27 SDOH — ECONOMIC STABILITY: FOOD INSECURITY: WITHIN THE PAST 12 MONTHS, YOU WORRIED THAT YOUR FOOD WOULD RUN OUT BEFORE YOU GOT MONEY TO BUY MORE.: NEVER TRUE

## 2025-08-27 ASSESSMENT — ENCOUNTER SYMPTOMS
CHEST TIGHTNESS: 0
CONSTIPATION: 0
ABDOMINAL PAIN: 0
NAUSEA: 0
TROUBLE SWALLOWING: 0
COUGH: 0
DIARRHEA: 0
SINUS PRESSURE: 0
SHORTNESS OF BREATH: 0

## 2025-08-27 ASSESSMENT — PATIENT HEALTH QUESTIONNAIRE - PHQ9
1. LITTLE INTEREST OR PLEASURE IN DOING THINGS: NOT AT ALL
SUM OF ALL RESPONSES TO PHQ QUESTIONS 1-9: 0
2. FEELING DOWN, DEPRESSED OR HOPELESS: NOT AT ALL
SUM OF ALL RESPONSES TO PHQ QUESTIONS 1-9: 0

## 2025-08-28 LAB
C TRACH DNA CVX QL NAA+PROBE: NEGATIVE
N GONORRHOEA DNA CERV MUCUS QL NAA+PROBE: NEGATIVE
TRICHOMONAS VAGINALIS DNA: NEGATIVE

## 2025-08-29 ENCOUNTER — HOSPITAL ENCOUNTER (OUTPATIENT)
Dept: ULTRASOUND IMAGING | Age: 60
Discharge: HOME | End: 2025-08-29
Payer: COMMERCIAL

## 2025-08-29 DIAGNOSIS — N93.9 VAGINAL BLEEDING, ABNORMAL: ICD-10-CM

## 2025-08-29 PROCEDURE — 76856 US EXAM PELVIC COMPLETE: CPT
